# Patient Record
Sex: FEMALE | URBAN - METROPOLITAN AREA
[De-identification: names, ages, dates, MRNs, and addresses within clinical notes are randomized per-mention and may not be internally consistent; named-entity substitution may affect disease eponyms.]

---

## 2018-09-21 ENCOUNTER — TELEPHONE (OUTPATIENT)
Dept: TRANSPLANT | Facility: CLINIC | Age: 58
End: 2018-09-21

## 2018-09-21 NOTE — TELEPHONE ENCOUNTER
----- Message from Medhat Zarate sent at 9/21/2018 12:16 PM CDT -----  Regarding: FW: Outside patient referral  Have medical recorders that where scanned into media from [facility]. Will call referring MD office if we need any additional information on the pt.    By: Medhat Zarate  ----- Message -----  From: Rianna Walsh  Sent: 9/21/2018  12:00 PM  To: [facility] Referral Pool  Subject: Outside patient referral                         Good afternoon,    Patient is being referred to Dr. Patricio for Hep C and abnormal liver enzymes. Referral and records scanned into .     Thanks!  Rianna

## 2018-09-26 ENCOUNTER — TELEPHONE (OUTPATIENT)
Dept: HEPATOLOGY | Facility: CLINIC | Age: 58
End: 2018-09-26

## 2018-09-26 NOTE — TELEPHONE ENCOUNTER
Dr. Hema Chanel ordered that patient be scheduled for hep c consult appt with Dr. Patricio.  I spoke with patient and scheduled her to see provider on 12/20/18; appt notice mailed.  Clinicals scanned into media.

## 2018-10-01 ENCOUNTER — DOCUMENTATION ONLY (OUTPATIENT)
Dept: TRANSPLANT | Facility: CLINIC | Age: 58
End: 2018-10-01

## 2018-10-01 NOTE — LETTER
October 1, 2018    Hema Chanel MD  [address]  [address]  [place] [address]      Dear Dr. Chanel    Patient: Mariann Morel   MR Number: [id number]   YOB: 1960     Thank you for the referral of Mariann Morel to the [facility] program. An initial appointment will be scheduled for your patient with one of our Hepatologists.      Thank you again for your trust in our program.  If there is anything we can do for you or your staff, please feel free to contact us.        Sincerely,        [facility]  [address] [place] [id number]  [phone]

## 2018-10-01 NOTE — LETTER
October 1, 2018    Mariann Morel  [address] [facility] [facility] [address]      Dear Mariann Morel:    Your doctor has referred you to the [facility]. You will be contacted by our office and an initial appointment will then be scheduled for you.    We look forward to seeing you soon. If you have any further questions, please contact us at [phone].       Sincerely,        [facility]   [address] [place] [address]  [phone]

## 2018-10-01 NOTE — NURSING
Pt records reviewed.  Pt will be referred to Hepatitis C Clinic due to HEP C + DNA  Initial referral received from DR. Hema Chanel   Referral letter sent to provider and patient.

## 2019-02-22 ENCOUNTER — TELEPHONE (OUTPATIENT)
Dept: HEPATOLOGY | Facility: CLINIC | Age: 59
End: 2019-02-22

## 2019-02-22 NOTE — TELEPHONE ENCOUNTER
----- Message from Savita Del Real RN sent at 2/21/2019  9:27 AM CST -----  Contact: Patient   I spoke with patient and cancelled appt as requested.  She needs to be added to waitlist for S[facility] in May 2019 at [facility].  I can't find the button to add her.  Can you please add her to Sheng's list.  ----- Message -----  From: Silvia Oconnor  Sent: 2/21/2019   8:33 AM  To: [facility] Hepatitis C Staff    Patient Requesting Appointment.     Reason for appt.:  Cxl Today's 02/21/19 appointment and Rescdule     When is the first available appointment?  n/a    Communication Preference: [phone]      Additional Information: n/a

## 2019-04-17 ENCOUNTER — TELEPHONE (OUTPATIENT)
Dept: HEPATOLOGY | Facility: CLINIC | Age: 59
End: 2019-04-17

## 2019-04-17 NOTE — TELEPHONE ENCOUNTER
----- Message from Savita Fisher sent at 4/17/2019 10:40 AM CDT -----  Contact: PATIENT       ----- Message -----  From: Silvia Oconnor  Sent: 4/17/2019  10:37 AM  To: Hepatology Scheduling    Needs Advice    Reason for call: patient STATED SHE RECEIVED A CALL TO SCHEDULE APPOINTMENT         Communication Preference: [phone]    Additional Information: n/a

## 2019-04-17 NOTE — TELEPHONE ENCOUNTER
Dr. Chanel referred patient for hep c consult appt 9/2018.  Patient on waiting list to see another provider.  I spoke with patient and consult appt with PA Scheuermann scheduled 6/25/19 in [place] since patient did not want to come to the city.  Clinicals in media.

## 2019-04-17 NOTE — TELEPHONE ENCOUNTER
Called patient to offer an appt at our [facility]. Had to leave a message waiting for a call back.

## 2019-06-25 ENCOUNTER — INITIAL CONSULT (OUTPATIENT)
Dept: HEPATOLOGY | Facility: CLINIC | Age: 59
End: 2019-06-25
Payer: MEDICAID

## 2019-06-25 ENCOUNTER — LAB VISIT (OUTPATIENT)
Dept: LAB | Facility: HOSPITAL | Age: 59
End: 2019-06-25
Attending: SPECIALIST
Payer: MEDICAID

## 2019-06-25 VITALS
HEART RATE: 89 BPM | WEIGHT: 256.81 LBS | DIASTOLIC BLOOD PRESSURE: 97 MMHG | SYSTOLIC BLOOD PRESSURE: 145 MMHG | BODY MASS INDEX: 41.27 KG/M2 | HEIGHT: 66 IN

## 2019-06-25 DIAGNOSIS — K74.60 HEPATIC CIRRHOSIS, UNSPECIFIED HEPATIC CIRRHOSIS TYPE, UNSPECIFIED WHETHER ASCITES PRESENT: ICD-10-CM

## 2019-06-25 DIAGNOSIS — B18.2 CHRONIC HEPATITIS C WITHOUT HEPATIC COMA: Primary | ICD-10-CM

## 2019-06-25 DIAGNOSIS — B18.2 CHRONIC HEPATITIS C WITHOUT HEPATIC COMA: ICD-10-CM

## 2019-06-25 LAB
ALBUMIN SERPL BCP-MCNC: 2.9 G/DL (ref 3.5–5.2)
ALP SERPL-CCNC: 120 U/L (ref 55–135)
ALT SERPL W/O P-5'-P-CCNC: 71 U/L (ref 10–44)
ANION GAP SERPL CALC-SCNC: 9 MMOL/L (ref 8–16)
AST SERPL-CCNC: 94 U/L (ref 10–40)
BASOPHILS # BLD AUTO: 0.03 K/UL (ref 0–0.2)
BASOPHILS NFR BLD: 0.7 % (ref 0–1.9)
BILIRUB SERPL-MCNC: 0.6 MG/DL (ref 0.1–1)
BUN SERPL-MCNC: 7 MG/DL (ref 6–20)
CALCIUM SERPL-MCNC: 9.5 MG/DL (ref 8.7–10.5)
CHLORIDE SERPL-SCNC: 104 MMOL/L (ref 95–110)
CO2 SERPL-SCNC: 24 MMOL/L (ref 23–29)
CREAT SERPL-MCNC: 0.7 MG/DL (ref 0.5–1.4)
DIFFERENTIAL METHOD: ABNORMAL
EOSINOPHIL # BLD AUTO: 0.1 K/UL (ref 0–0.5)
EOSINOPHIL NFR BLD: 2.9 % (ref 0–8)
ERYTHROCYTE [DISTWIDTH] IN BLOOD BY AUTOMATED COUNT: 12.4 % (ref 11.5–14.5)
EST. GFR  (AFRICAN AMERICAN): >60 ML/MIN/1.73 M^2
EST. GFR  (NON AFRICAN AMERICAN): >60 ML/MIN/1.73 M^2
GLUCOSE SERPL-MCNC: 96 MG/DL (ref 70–110)
HCT VFR BLD AUTO: 42.2 % (ref 37–48.5)
HGB BLD-MCNC: 13.7 G/DL (ref 12–16)
IMM GRANULOCYTES # BLD AUTO: 0.01 K/UL (ref 0–0.04)
IMM GRANULOCYTES NFR BLD AUTO: 0.2 % (ref 0–0.5)
INR PPP: 1.3 (ref 0.8–1.2)
LYMPHOCYTES # BLD AUTO: 1.4 K/UL (ref 1–4.8)
LYMPHOCYTES NFR BLD: 32.5 % (ref 18–48)
MCH RBC QN AUTO: 29.5 PG (ref 27–31)
MCHC RBC AUTO-ENTMCNC: 32.5 G/DL (ref 32–36)
MCV RBC AUTO: 91 FL (ref 82–98)
MONOCYTES # BLD AUTO: 0.9 K/UL (ref 0.3–1)
MONOCYTES NFR BLD: 21.4 % (ref 4–15)
NEUTROPHILS # BLD AUTO: 1.8 K/UL (ref 1.8–7.7)
NEUTROPHILS NFR BLD: 42.3 % (ref 38–73)
NRBC BLD-RTO: 0 /100 WBC
PLATELET # BLD AUTO: 103 K/UL (ref 150–350)
PMV BLD AUTO: 12.9 FL (ref 9.2–12.9)
POTASSIUM SERPL-SCNC: 4 MMOL/L (ref 3.5–5.1)
PROT SERPL-MCNC: 7.8 G/DL (ref 6–8.4)
PROTHROMBIN TIME: 12.7 SEC (ref 9–12.5)
RBC # BLD AUTO: 4.65 M/UL (ref 4–5.4)
SODIUM SERPL-SCNC: 137 MMOL/L (ref 136–145)
WBC # BLD AUTO: 4.21 K/UL (ref 3.9–12.7)

## 2019-06-25 PROCEDURE — 85610 PROTHROMBIN TIME: CPT | Mod: PO

## 2019-06-25 PROCEDURE — 82105 ALPHA-FETOPROTEIN SERUM: CPT

## 2019-06-25 PROCEDURE — 99204 PR OFFICE/OUTPT VISIT, NEW, LEVL IV, 45-59 MIN: ICD-10-PCS | Mod: S$PBB,,, | Performed by: PHYSICIAN ASSISTANT

## 2019-06-25 PROCEDURE — 87902 NFCT AGT GNTYP ALYS HEP C: CPT

## 2019-06-25 PROCEDURE — 86703 HIV-1/HIV-2 1 RESULT ANTBDY: CPT

## 2019-06-25 PROCEDURE — 86706 HEP B SURFACE ANTIBODY: CPT

## 2019-06-25 PROCEDURE — 80053 COMPREHEN METABOLIC PANEL: CPT

## 2019-06-25 PROCEDURE — 86790 VIRUS ANTIBODY NOS: CPT

## 2019-06-25 PROCEDURE — 99999 PR PBB SHADOW E&M-EST. PATIENT-LVL III: ICD-10-PCS | Mod: PBBFAC,,, | Performed by: PHYSICIAN ASSISTANT

## 2019-06-25 PROCEDURE — 86704 HEP B CORE ANTIBODY TOTAL: CPT

## 2019-06-25 PROCEDURE — 36415 COLL VENOUS BLD VENIPUNCTURE: CPT | Mod: PO

## 2019-06-25 PROCEDURE — 99999 PR PBB SHADOW E&M-EST. PATIENT-LVL III: CPT | Mod: PBBFAC,,, | Performed by: PHYSICIAN ASSISTANT

## 2019-06-25 PROCEDURE — 99213 OFFICE O/P EST LOW 20 MIN: CPT | Mod: PBBFAC,PO | Performed by: PHYSICIAN ASSISTANT

## 2019-06-25 PROCEDURE — 87522 HEPATITIS C REVRS TRNSCRPJ: CPT

## 2019-06-25 PROCEDURE — 99204 OFFICE O/P NEW MOD 45 MIN: CPT | Mod: S$PBB,,, | Performed by: PHYSICIAN ASSISTANT

## 2019-06-25 PROCEDURE — 87340 HEPATITIS B SURFACE AG IA: CPT

## 2019-06-25 PROCEDURE — 85025 COMPLETE CBC W/AUTO DIFF WBC: CPT

## 2019-06-25 RX ORDER — PANTOPRAZOLE SODIUM 40 MG/1
1 TABLET, DELAYED RELEASE ORAL DAILY
Refills: 2 | COMMUNITY
Start: 2019-03-20 | End: 2019-09-10

## 2019-06-25 RX ORDER — MELOXICAM 15 MG/1
TABLET ORAL
Refills: 1 | COMMUNITY
Start: 2019-03-20 | End: 2019-09-10

## 2019-06-25 RX ORDER — IBUPROFEN 200 MG
200 TABLET ORAL EVERY 6 HOURS PRN
COMMUNITY

## 2019-06-25 NOTE — PROGRESS NOTES
HEPATOLOGY CLINIC VISIT NOTE - HCV clinic    OUTSIDE REFERRING PROVIDER: Dr. Hema Chanel     CHIEF COMPLAINT: Hepatitis C     HISTORY: This is a 59 y.o. Black female with chronic hepatitis C, here for further eval / mngmt.   Outside records have been received / reviewed.     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)    No blood transfusions    No tattoos  - Treatment naive  - Genotype unknown  - HCV RNA >1 million IU/mL - 9/12/18    Liver staging:  HCV FibroSURE 9/13/18 - A3, F4    Labs 9/2018:  FIB-4 -- 4.08, (indicates advanced fibrosis more likely)  APRI -- 1.57, (indicates advanced fibrosis more likely)    No liver imaging  Labs reveal well preserved liver function    Liver disease appears well compensated  -- Ascites - none  -- HE - none  -- Jaundice / TBili - none  -- Albumin -none    MELD score 9/2018 - 8    HCC screening - needed      Feels well  Denies jaundice, dark urine, abdominal distention, hematemesis, melena, slowed mentation.   No abnormal skin rashes. No generalized joint pain.                     Past Medical History:   Diagnosis Date    Chronic hepatitis C     GERD (gastroesophageal reflux disease)        Past Surgical History:   Procedure Laterality Date    BILATERAL TUBAL LIGATION         FAMILY HISTORY: Negative for liver disease    SOCIAL HISTORY:   Works at nursing home. . Adult children.  Social History     Tobacco Use   Smoking Status Never Smoker     Alcohol - drinks ~ 6 beers per day x many years  Drugs - denies      ROS:   No fever, chills, weight loss, fatigue  No chest pain, palpitations, dyspnea, cough  No abdominal pain, change in bowel pattern, nausea, vomiting, (+) GERD  No dysuria, hematuria   No skin rashes   No headaches  No lower extremity edema  No depression or  anxiety      PHYSICAL EXAM:  Friendly Unknown female, in no acute distress; alert and oriented to person, place and time  VITALS: reviewed  HEENT: Sclerae anicteric.   NECK: Supple  CVS: Regular rate and rhythm. No murmurs  LUNGS: Normal respiratory effort. Clear bilaterally  ABDOMEN: Obese, soft, nontender. No organomegaly or masses. No ascites or hernias. Good bowel sounds   SKIN: Warm and dry. No jaundice, No obvious rashes.   EXTREMITIES: No lower extremity edema  NEURO/PSYCH: Normal gate. Memory intact. Thought and speech pattern appropriate. Behavior normal. No depression or anxiety noted.    RECENT LABS:  OUTSIDE LABS (in media)  9/12/18  WBC 5.3, HGB 14.1,   , K 3.4, BUN 10, CR 0.74, ALBUMIN 3.6, ALKPHOS 101, TBILI 0.9, AST 80, ALT 80  INR 1.25  HBsAg neg    RECENT IMAGING:  None    ASSESSMENT  59 y.o. Unknown female with:  1. CHRONIC HEPATITIS C, GENOTYPE UNKNOWN - treatment naive  -- Elevated transaminases  -- Unknown Immunity to HAV & HBV    2. CIRRHOSIS  -- HCV FibroSURE 9/2018 - F4  -- MELD score 9/2018 - 8  -- HCC screening - needed      EDUCATION:  HCV  The natural history of Hepatitis C, including potential progression to cirrhosis was reviewed. We discussed the increased progression of liver disease secondary to alcohol use; patient was advised to avoid alcohol completely.     Transmission of Hepatitis C was reviewed, including possible sexual transmission. Sexual contacts should be screened.   Risk of vertical transmission of Hepatitis C from mother to baby was reviewed. Children should be screened.   Patient should avoid sharing personal products such as razors, toothbrushes, etc.     Recommend avoiding raw seafood.  Limit acetaminophen to 2000mg daily.      CIRRHOSIS  Discussed evidence that indicates that pt has cirrhosis.   -- Discussed the basics about liver fibrosis /scarring and how that relates to liver function.   -- Signs and symptoms of hepatic decompensation were reviewed,  including jaundice, ascites, and slowed mentation due to hepatic encephalopathy. The patient should seek medical attention if any of these things occur.   --  We discussed the potential for bleeding from esophageal varices with symptoms of hematemesis and melena. The patient should report to the Emergency Department for these symptoms.   -- We discussed the increased risk of hepatocellular carcinoma due to cirrhosis. Lifelong screening every six months with ultrasound and AFP is recommended.         PLAN:  1. Labs & imaging  Orders Placed This Encounter   Procedures    US Abdomen Complete    Hepatitis C genotype    HIV 1/2 Ag/Ab (4th Gen)    Hepatitis B surface antigen    Hepatitis B surface antibody    Hepatitis B core antibody, total    Hepatitis A antibody, IgG    Comprehensive metabolic panel    CBC auto differential    Protime-INR    AFP tumor marker       2. Follow up visit. Goal of antiviral therapy

## 2019-06-25 NOTE — LETTER
June 25, 2019      Hema Chanel MD  [address]  [address]  [place] [address]           [facility]  [address] [facility] [address] [place] [address]  Phone: [phone]          Patient: Mariann Morel   MR Number: [id number]   YOB: 1960   Date of Visit: 6/25/2019       Dear Dr. Hema Chanel:    Thank you for referring Mariann Morel to me for evaluation. Attached you will find relevant portions of my assessment and plan of care.    If you have questions, please do not hesitate to call me. I look forward to following Mariann Morel along with you.    Sincerely,    Jennifer B. Scheuermann, PA    Enclosure  CC:  No Recipients    If you would like to receive this communication electronically, please contact [email or web address] or [phone] to request more information on [facility] [employer] access.    For providers and/or their staff who would like to refer a patient to [facility], please contact us through our one-stop-shop provider referral line, [facility] [place], at [phone].    If you feel you have received this communication in error or would no longer like to receive these types of communications, please e-mail [email or web address]

## 2019-06-26 ENCOUNTER — TELEPHONE (OUTPATIENT)
Dept: HEPATOLOGY | Facility: CLINIC | Age: 59
End: 2019-06-26

## 2019-06-26 DIAGNOSIS — K74.60 HEPATIC CIRRHOSIS, UNSPECIFIED HEPATIC CIRRHOSIS TYPE, UNSPECIFIED WHETHER ASCITES PRESENT: Primary | ICD-10-CM

## 2019-06-26 DIAGNOSIS — R77.2 ELEVATED AFP: ICD-10-CM

## 2019-06-26 LAB
AFP SERPL-MCNC: 28 NG/ML (ref 0–8.4)
HBV CORE AB SERPL QL IA: NEGATIVE
HBV SURFACE AB SER-ACNC: NEGATIVE M[IU]/ML
HBV SURFACE AG SERPL QL IA: NEGATIVE
HEPATITIS A ANTIBODY, IGG: POSITIVE
HIV 1+2 AB+HIV1 P24 AG SERPL QL IA: NEGATIVE

## 2019-06-26 NOTE — TELEPHONE ENCOUNTER
6/25 labs reviewed  AFP 28 - no prior for comparison    pls call pt  Tell her one of markers for liver is a little higher than normal. This could be from the HCV but I think we need to get a closer picture of her liver (not just an U/S)  · Cancel 9/2019 u/s  · Schedule tpCT (I know she has some transportation difficulties, but if possible I'd like her to do this before September when she comes to see me)  · Keep appt w/ me in 9/2019

## 2019-06-27 NOTE — TELEPHONE ENCOUNTER
Pt u/s canceled as instructed by provider. Pt requested to have Ct of abd completed locally at [facility]. External referral entered for pt. Orders will be faxed to facility after auth. Pt informed of change.

## 2019-06-28 LAB
HCV GENTYP SERPL NAA+PROBE: ABNORMAL
HCV RNA SERPL NAA+PROBE-LOG IU: 6.18 LOG (10) IU/ML
HCV RNA SERPL QL NAA+PROBE: DETECTED
HCV RNA SPEC NAA+PROBE-ACNC: ABNORMAL IU/ML

## 2019-07-01 ENCOUNTER — TELEPHONE (OUTPATIENT)
Dept: HEPATOLOGY | Facility: CLINIC | Age: 59
End: 2019-07-01

## 2019-07-01 NOTE — TELEPHONE ENCOUNTER
----- Message from Balbina Keene sent at 7/1/2019  2:26 PM CDT -----  Contact: Patient   Needs Advice    Reason for call: Would like to know why the US was canceled?        Communication Preference: [phone]    Additional Information: N/A

## 2019-07-01 NOTE — TELEPHONE ENCOUNTER
Attempted to contact pt by phone. No answer. VM left relaying PA Scheuermann msg again on why u/s was canceled. Awaiting call back.

## 2019-07-02 ENCOUNTER — TELEPHONE (OUTPATIENT)
Dept: HEPATOLOGY | Facility: CLINIC | Age: 59
End: 2019-07-02

## 2019-07-02 NOTE — TELEPHONE ENCOUNTER
I spoke with patient and msg from PA Scheuermann relayed.  Order for US faxed to [facility].  Patient states that she will schedule US ASAP.

## 2019-07-02 NOTE — TELEPHONE ENCOUNTER
----- Message from Rain Cutler MA sent at 7/1/2019  4:22 PM CDT -----  Contact: Patient       ----- Message -----  From: Balbina Keene  Sent: 7/1/2019   4:11 PM  To: Scheuermann Jennifer B Staff    Patient Returning Call from [place]    Who Left Message for Patient: Rain    Communication Preference: [phone]     Additional Information: N/A

## 2019-07-09 ENCOUNTER — TELEPHONE (OUTPATIENT)
Dept: HEPATOLOGY | Facility: CLINIC | Age: 59
End: 2019-07-09

## 2019-07-09 NOTE — TELEPHONE ENCOUNTER
I spoke with patient and msg from PA Scheuermann relayed.  Order for CT faxed to [facility]. Patient told to setup test locally.

## 2019-07-09 NOTE — TELEPHONE ENCOUNTER
Outside u/s 7/3/19 reviewed - Fatty liver, no liver masses    Again noted pt has cirrhosis and elevated AFP of 28 from 6/25/19  _______________________________________________________  Please reschedule tpCT  (remind her this is the test I originally wanted her to have but Medicaid would not allow her to do it until she had an u/s first)  My preference would be at [facility] facility but if she can't have that done I understand.

## 2019-07-10 ENCOUNTER — TELEPHONE (OUTPATIENT)
Dept: HEPATOLOGY | Facility: CLINIC | Age: 59
End: 2019-07-10

## 2019-07-10 NOTE — TELEPHONE ENCOUNTER
I spoke with Ty and informed her that CT auth was pending and that we will call once it is in place.

## 2019-07-10 NOTE — TELEPHONE ENCOUNTER
----- Message from Rain Cutler MA sent at 7/9/2019  3:21 PM CDT -----  Contact: Alan w/ [facility]       ----- Message -----  From: Rain Cutler MA  Sent: 7/9/2019   3:20 PM  To: Savita Del Real RN        ----- Message -----  From: Balbina Keene  Sent: 7/9/2019   3:06 PM  To: Scheuermann Jennifer B Staff    Needs Advice    Reason for call: CT orders need to be pre cert by the insurance company        Communication Preference: [phone]    Additional Information: Ty will be in the office until 3:30pm and will return tomorrow at 7am

## 2019-07-15 ENCOUNTER — TELEPHONE (OUTPATIENT)
Dept: HEPATOLOGY | Facility: CLINIC | Age: 59
End: 2019-07-15

## 2019-07-15 DIAGNOSIS — K74.60 HEPATIC CIRRHOSIS, UNSPECIFIED HEPATIC CIRRHOSIS TYPE, UNSPECIFIED WHETHER ASCITES PRESENT: Primary | ICD-10-CM

## 2019-07-15 NOTE — TELEPHONE ENCOUNTER
Physician to physician discussion:   MD at Medicaid unable to approve tpCT since U/S was normal. Recommended I start a formal appeal.    Per Edda at Medicaid appeals office ([phone]):  We must fax to Medicaid ([phone]) a written request for the appeal from the patient. Once this is faxed I can call back to move forward with the appeal    Morena - can we send the following letter to the patient for her to sign and then send back to us? Can you include her name, , Medicaid number on the letter?  I need to know when we receive the letter so we can fax to Medicaid and I can call them for the appeal.     To Whom it May Concern:  I, Mariann Morel, request that Jennifer Scheuermann, PA-C, appeal Medicaid on my behalf for the abdominal CT scan that has been denied.       ADDENDUM:  1.) For now, disregard above message and see below:  Received VM from [place] (phone [phone]) [facility] [employer]  Tracking #[id number]  She reports that a [facility] Appeal Representative Form must be signed by member before we can move forward with the appeal.  This form was reportedly mailed to member and to me on 19. I have not received it. Does the pt have this form? If so, she needs to sign it and send it to us so we can submit it to move forward with the appeal. (If the form can not be located, can you please call [place] and see if we can have a new form sent to us?)      2.) schedule repeat AFP around 19    thanks

## 2019-07-15 NOTE — TELEPHONE ENCOUNTER
----- Message from Savita Del Real RN sent at 7/10/2019  2:20 PM CDT -----  I spoke with Haylie in referral department.  She reports forwarding US report to patient's insurance company but states that they are still denying CT.  Per Haylie you have to engage in a physician to physician discussion to have the decision overturned.  You should call [phone] and follow the prompts to engage in a Physician-to-Physician discussion. The case number is:[id number].

## 2019-07-17 ENCOUNTER — TELEPHONE (OUTPATIENT)
Dept: HEPATOLOGY | Facility: CLINIC | Age: 59
End: 2019-07-17

## 2019-07-17 NOTE — TELEPHONE ENCOUNTER
----- Message from Savita Del Real RN sent at 7/17/2019 12:48 PM CDT -----  I spoke with Naun at [facility] [phone].  He states that appeals form will be faxed to us for completion in 1 - 2 business days.  I spoke with patient.  She is going to provide me with a fax number tomorrow to send form to.  She will complete form allowing you to appeal and fax it back to number provided to her by her insurance company.

## 2019-07-18 NOTE — TELEPHONE ENCOUNTER
I spoke with Naun at [facility].  He states that form will be faxed to our office in 1 -2 business days for patient to complete.  I spoke with patient.  She asked that form be faxed to her at her job ( fax # [phone]) for her completion once received.  Order for AFP to be done locally mailed to patient.

## 2019-07-29 ENCOUNTER — EPISODE CHANGES (OUTPATIENT)
Dept: HEPATOLOGY | Facility: CLINIC | Age: 59
End: 2019-07-29

## 2019-07-29 ENCOUNTER — TELEPHONE (OUTPATIENT)
Dept: HEPATOLOGY | Facility: CLINIC | Age: 59
End: 2019-07-29

## 2019-07-29 LAB — EXT AFP: 33.1 NG/ML

## 2019-07-29 NOTE — TELEPHONE ENCOUNTER
AFP 6/25/19 - 28  ---> AFP 7/25/19 - 33    Outside u/s 7/3/19 reviewed - Fatty liver, no liver masses  Trying to get tpCT abdomen approved  Pt sent in form granting permission for me to appeal on her behalf.    Can you please contact Judy in the Appeals dept [facility]. Her direct line is [phone]. Let her know we have the form and want to move forward w/ the appeal.    Important to let insurance know that she has cirrhosis and AFP is rising so she needs tpCT even though U/S was normal.      pls tell pt we are still working to get the tpCT approved, especially since her tumor marker bumped up a little.    thanks

## 2019-08-01 NOTE — TELEPHONE ENCOUNTER
I spoke with Judy and she confirmed receipt of form/clinicals faxed to her on 7/31/19.  She stated that appeal process would start today.  I spoke with patient and msg from PA Scheuermann relayed and with updated info regarding appeal.

## 2019-08-16 NOTE — TELEPHONE ENCOUNTER
pls call to check on status of auth for CT abdomen.  CT not scheduled anymore so looks like it needs to be r/s once auth is approved

## 2019-08-21 ENCOUNTER — TELEPHONE (OUTPATIENT)
Dept: HEPATOLOGY | Facility: CLINIC | Age: 59
End: 2019-08-21

## 2019-08-21 NOTE — TELEPHONE ENCOUNTER
----- Message from Savita Del Real RN sent at 8/21/2019  1:40 PM CDT -----  Judy left  today stating that CT was approved from 8/13/19 to 9/12/19; approval # [id number].  I spoke with staff at [facility] and patient.  The two will coordinate a time for testing.

## 2019-08-21 NOTE — TELEPHONE ENCOUNTER
----- Message from Joy Phillips RN sent at 8/21/2019  2:21 PM CDT -----  Contact: Vasiliy / [facility] [phone]      ----- Message -----  From: Gisela Gillespie  Sent: 8/21/2019   2:07 PM  To: [facility] Hepat Clinical Staff    Vasiliy is requesting orders for BUN and Creatine. She has to have the values before PT can have contrast for CT tomorrow at 1030 AM.

## 2019-08-26 ENCOUNTER — TELEPHONE (OUTPATIENT)
Dept: HEPATOLOGY | Facility: CLINIC | Age: 59
End: 2019-08-26

## 2019-08-26 LAB
EXT ALBUMIN: 3.5
EXT ALKALINE PHOSPHATASE: 129
EXT ALT: 81
EXT AST: 124
EXT BILIRUBIN TOTAL: 0.9
EXT BUN: 8
EXT CALCIUM: 9.3
EXT CHLORIDE: 107
EXT CO2: 28
EXT CREATININE: 0.56 MG/DL
EXT GFR MDRD AF AMER: >60
EXT GFR MDRD NON AF AMER: >60
EXT GLUCOSE: 110
EXT POTASSIUM: 4.4
EXT PROTEIN TOTAL: 8
EXT SODIUM: 141 MMOL/L

## 2019-08-27 NOTE — TELEPHONE ENCOUNTER
I spoke with patient and msg from PA Scheuermann regarding lab result relayed. CT report received and placed in provider folder for review.

## 2019-08-27 NOTE — TELEPHONE ENCOUNTER
Labs 8/22 stable  I think these were to be done before her CT  Not sure if we need to send them some where or if radiology already saw them and outside CT was completed    She needs to keep appt w/ me in Sept and we need to get outside CT results    thanks

## 2019-09-05 ENCOUNTER — TELEPHONE (OUTPATIENT)
Dept: HEPATOLOGY | Facility: CLINIC | Age: 59
End: 2019-09-05

## 2019-09-05 NOTE — TELEPHONE ENCOUNTER
- Outside CT abdomen 8/22/19 reviewed:  Nodular liver surface  No liver lesions  No ascites  Spleen normal    - Outside labs 8/22/19 reviewed:  , ALT 81, ALK PHOS 129, TBILI 0.9, ALBUMIN 3.5, , K+ 4.4, BUN 8, CR 0.56    Pls tell pt CT looked okay.   Keep f/u visit later this month    thanks

## 2019-09-10 ENCOUNTER — TELEPHONE (OUTPATIENT)
Dept: PHARMACY | Facility: CLINIC | Age: 59
End: 2019-09-10

## 2019-09-10 ENCOUNTER — OFFICE VISIT (OUTPATIENT)
Dept: HEPATOLOGY | Facility: CLINIC | Age: 59
End: 2019-09-10
Payer: MEDICAID

## 2019-09-10 VITALS
BODY MASS INDEX: 40.89 KG/M2 | DIASTOLIC BLOOD PRESSURE: 79 MMHG | WEIGHT: 254.44 LBS | SYSTOLIC BLOOD PRESSURE: 125 MMHG | HEART RATE: 75 BPM | HEIGHT: 66 IN

## 2019-09-10 DIAGNOSIS — D69.6 THROMBOCYTOPENIA: ICD-10-CM

## 2019-09-10 DIAGNOSIS — K74.60 CIRRHOSIS OF LIVER WITHOUT ASCITES, UNSPECIFIED HEPATIC CIRRHOSIS TYPE: Primary | ICD-10-CM

## 2019-09-10 DIAGNOSIS — R74.8 ABNORMAL TRANSAMINASES: ICD-10-CM

## 2019-09-10 DIAGNOSIS — B18.2 CHRONIC HEPATITIS C WITHOUT HEPATIC COMA: ICD-10-CM

## 2019-09-10 PROCEDURE — 99215 PR OFFICE/OUTPT VISIT, EST, LEVL V, 40-54 MIN: ICD-10-PCS | Mod: S$PBB,,, | Performed by: PHYSICIAN ASSISTANT

## 2019-09-10 PROCEDURE — 99999 PR PBB SHADOW E&M-EST. PATIENT-LVL III: CPT | Mod: PBBFAC,,, | Performed by: PHYSICIAN ASSISTANT

## 2019-09-10 PROCEDURE — 99215 OFFICE O/P EST HI 40 MIN: CPT | Mod: S$PBB,,, | Performed by: PHYSICIAN ASSISTANT

## 2019-09-10 PROCEDURE — 99213 OFFICE O/P EST LOW 20 MIN: CPT | Mod: PBBFAC,PO | Performed by: PHYSICIAN ASSISTANT

## 2019-09-10 PROCEDURE — 99999 PR PBB SHADOW E&M-EST. PATIENT-LVL III: ICD-10-PCS | Mod: PBBFAC,,, | Performed by: PHYSICIAN ASSISTANT

## 2019-09-10 RX ORDER — VELPATASVIR AND SOFOSBUVIR 100; 400 MG/1; MG/1
1 TABLET, FILM COATED ORAL DAILY
Qty: 28 TABLET | Refills: 2 | Status: SHIPPED | OUTPATIENT
Start: 2019-09-10 | End: 2020-04-27 | Stop reason: ALTCHOICE

## 2019-09-10 NOTE — PATIENT INSTRUCTIONS
See Primary Care doctor or employer to get Hepatitis B vaccine series (3 shots given over 6 months)    CIRRHOSIS COUNSELING   AVOID ALL alcohol (includes beer, wine and liquor)   AVOID non-steroidal anti-inflammatory drugs (NSAIDs) such as ibuprofen (Motrin, Advil), naproxen (Naprosyn, Aleve)    Tylenol/acetaminophen is OKAY for pain, no more than 2000 mg per day   NO RAW SEAFOOD due to the risk of infection   LOW SODIUM / SALT diet, less than 2000 mg per day (avoid canned foods, avoid adding salt to food, read food labels)   HIGH PROTEIN DIET to prevent muscle mass loss (meat, chicken, fish, eggs, dairy, whey protein powder, protein shakes)    Liver cancer screening (blood tests and ultrasound) is needed every 6 months forever.  This is likely due again in February but I will monitor the blood tumor marker while on HCV treatment    Call Dr Chanel to schedule EGD (upper scope) to check for varices (swollen vessels) [phone]      Call us when you have your HCV medicines so we can schedule labs.

## 2019-09-10 NOTE — PROGRESS NOTES
HEPATOLOGY CLINIC VISIT NOTE - HCV clinic    CHIEF COMPLAINT: Hepatitis C     HISTORY: This is a 59 y.o. Black female with well compensated HCV Cirrhosis.    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)    No blood transfusions    No tattoos  - Treatment naive  - Genotype 1b    Cirrhosis history:  HCV FibroSURE 9/13/18 - A3, F4  Labs 9/2018:   FIB-4 -- 4.08, (indicates advanced fibrosis more likely)   APRI -- 1.57, (indicates advanced fibrosis more likely)    Liver disease appears well compensated  -- Ascites - none  -- HE - none  -- Jaundice / TBili - none  -- Albumin - none  -- Platelets - decreased 103-127    MELD-Na score: 9 at 6/25/2019 12:10 PM  MELD score: 9 at 6/25/2019 12:10 PM  Calculated from:  Serum Creatinine: 0.7 mg/dL (Rounded to 1 mg/dL) at 6/25/2019 12:10 PM  Serum Sodium: 137 mmol/L at 6/25/2019 12:10 PM  Total Bilirubin: 0.6 mg/dL (Rounded to 1 mg/dL) at 6/25/2019 12:10 PM  INR(ratio): 1.3 at 6/25/2019 12:10 PM  Age: 59 years    Cirrhosis maintenance:  HCC screening - mildly elevated AFP likely due to HCV - will monitor. 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  Social History     Tobacco Use   Smoking Status Never Smoker     Alcohol - drinks ~ 6 beers per day x many years  Drugs - denies      ROS:   No fever, chills, weight loss, fatigue  No chest pain, palpitations, dyspnea, cough  No abdominal pain, nausea, vomiting, (+) rare GERD - not taking meds at this time  No skin rashes   No lower extremity edema  No depression or anxiety      PHYSICAL EXAM:  Friendly Unknown female, in no acute distress; alert and oriented to person, place and time  VITALS: reviewed  HEENT: Sclerae anicteric.   NECK: Supple  LUNGS: Normal respiratory effort.   ABDOMEN: Obese, soft, nontender.   SKIN: Warm and dry. No jaundice, No obvious rashes.   EXTREMITIES: No lower extremity edema  NEURO/PSYCH: Normal gate. Memory intact. Thought and speech pattern appropriate. Behavior normal. No depression or anxiety noted.    RECENT LABS:  OUTSIDE LABS (in media)  9/12/18  WBC 5.3, HGB 14.1,   , K 3.4, BUN 10, CR 0.74, ALBUMIN 3.6, ALKPHOS 101, TBILI 0.9, AST 80, ALT 80  INR 1.25  HBsAg neg    Outside labs 8/22/19:  , ALT 81, ALK PHOS 129, TBILI 0.9, ALBUMIN 3.5, , K+ 4.4, BUN 8, CR 0.56    RECENT IMAGING:  Outside U/S 7/3/19: increased echogenicity of liver, normal sized spleen. No mention of ascites    Outside CT abdomen 8/22/19 reviewed:  Nodular liver surface  No liver lesions  No ascites  Spleen normal    ASSESSMENT  59 y.o. black female with:  1. CHRONIC HEPATITIS C, GENOTYPE 1b - treatment naive  -- Elevated transaminases  -- (+) Immunity to HAV   -- Lacking immunity to HBV    2. WELL COMPENSATED CIRRHOSIS  -- HCV FibroSURE 9/2018 - F4  -- MELD score 6/2019 - 9  -- HCC screening - CT 8/2019 w/ no lesions, mildly elevated AFP likely due to HCV, will monitor  -- Varices screening - needed      EDUCATION:  HCV RX  Discussed goal of HCV eradication to prevent progression of liver  disease.  Discussed use of Epclusa daily x 12 weeks w/ potential side effects of fatigue and headache.     Reviewed limitations on acid suppressant medications due to DDI w/ Epclusa:  -- Antacids, H2 Receptor Antagonist, PPI - Pt not taking  Patient instructed to contact me if experiencing acid related symptoms so further recommendations can be made regarding acid suppression therapy.      Herbal / alternative therapies must be discontinued  Discussed importance of medication adherence and risk of treatment failure / viral resistance if not adherent. Pt has verbalized understanding.       CIRRHOSIS  Role of EGD in varices screening reviewed   AVOID ALL alcohol (includes beer, wine and liquor)   AVOID non-steroidal anti-inflammatory drugs (NSAIDs) such as ibuprofen (Motrin, Advil), naproxen (Naprosyn, Aleve)    Tylenol/acetaminophen is OKAY for pain, no more than 2000 mg per day   NO RAW SEAFOOD due to the risk of infection   LOW SODIUM / SALT diet, less than 2000 mg per day (avoid canned foods, avoid adding salt to food, read food labels)   HIGH PROTEIN DIET to prevent muscle mass loss (meat, chicken, fish, eggs, dairy, whey protein powder, protein shakes)      PLAN:  1. Pt to talk to employer or PCP to get HBV vaccine series  2. Pt to call Dr Chanel to schedule EGD for varices screen  3. Obtain authorization to treat HCV with Epclusa x 12 weeks  -- Rx will be routed to [facility]  -- Patient will notify me of exact treatment start date so appropriate lab f/u can be scheduled.  4. Will monitor AFP while on HCV RX. Assuming it trends down, next HCC screening will be due 2/2020 w/ U/S and AFP      Cc: Dr. Hema Chanel

## 2019-09-13 NOTE — TELEPHONE ENCOUNTER
Documentation Only: Prior Authorization for Epclusa IS NOT required Patient co-pay: $0 Patient Assistance IS NOT required. Forwarding to clinical pharmacist for consult and shipment. AKF

## 2019-09-20 ENCOUNTER — TELEPHONE (OUTPATIENT)
Dept: PHARMACY | Facility: CLINIC | Age: 59
End: 2019-09-20

## 2019-09-20 NOTE — TELEPHONE ENCOUNTER
Initial Medication Consultation : AG Epclusa     Initial Epclusa consult completed on . Epclusa will be shipped on  to arrive at patient's home on  via FedEx. $3.00 copay. Patient will start Epclusa on . Address confirmed, CC on file. Confirmed 2 patient identifiers - name and . Therapy Appropriate.     Epclusa 400/100mg- Take one tablet by mouth daily x 12 weeks  Counseling was reviewed:   1. Patient MUST take Epclusa at the SAME time every day.   2. Patient MUST avoid acid reducers without consulting with myself or provider first. Antacids are to be spaced out at least 4 hours apart from Epclusa.    3. Potential Side effects include: headaches and fatigue.   Headache: Patient may treat with OTC remedies. If Tylenol is used, dose should not exceed 2000mg per day.    4. Medication list reviewed. No DDIs or allergies noted. Patient MUST contact myself or provider prior to starting any new OTC, herbal, or prescription drugs to avoid potential DDIs.    DDI: Medication list reviewed and potential DDIs addressed.    Discussed the importance of staying well hydrated while on therapy. Compliance stressed - patient to take missed doses as soon as remembered, but NOT to take 2 doses in one day. Patient will report questions or concerns to myself or practitioner. Patient verbalizes understanding. Patient plans to start Epclusa on .  Consultation included: indication; goals of treatment; administration; storage and handling; side effects; how to handle side effects; the importance of compliance; how to handle missed doses; the importance of laboratory monitoring; the importance of keeping all follow up appointments.  Patient understands to report any medication changes to [facility] and provider. All questions answered and addressed to patients satisfaction. I will f/u with her in 1 week from start, [facility] to contact patient in 3 weeks for refills.     AG Epclusa dispensed due to Medicaid formulary. Any reference  to Epclusa in above note is for AG Denisse Leal, PharmD, [facility]  Clinical Pharmacist   [facility]  Phone: [phone]

## 2019-09-25 ENCOUNTER — EPISODE CHANGES (OUTPATIENT)
Dept: HEPATOLOGY | Facility: CLINIC | Age: 59
End: 2019-09-25

## 2019-09-26 ENCOUNTER — TELEPHONE (OUTPATIENT)
Dept: HEPATOLOGY | Facility: CLINIC | Age: 59
End: 2019-09-26

## 2019-09-27 NOTE — TELEPHONE ENCOUNTER
Pt beginning 12 weeks of Epclusa on 9/26/19  Lilian 1b, tx naive  F4    Pls schedule:  - CMP, HCV RNA, AFP at week 6 - 11/6/19  - CMP, HCV RNA - SVR12 - 3/11/20

## 2019-09-27 NOTE — TELEPHONE ENCOUNTER
----- Message from Savita Del Real RN sent at 9/25/2019 10:46 AM CDT -----  Patient states that she had EGD done 9/24 and will start hep c tx on 9/26.  Patient scheduled to see gastro MD 10/9 and states that she will have MD to fax EGD and path report to our office for review.

## 2019-09-30 ENCOUNTER — EPISODE CHANGES (OUTPATIENT)
Dept: HEPATOLOGY | Facility: CLINIC | Age: 59
End: 2019-09-30

## 2019-09-30 NOTE — TELEPHONE ENCOUNTER
Msg from provider mailed to patient.  Orders mailed to patient and faxed [facility] as ordered.

## 2019-10-16 ENCOUNTER — TELEPHONE (OUTPATIENT)
Dept: PHARMACY | Facility: CLINIC | Age: 59
End: 2019-10-16

## 2019-10-17 NOTE — TELEPHONE ENCOUNTER
(Epclusa refill 2 of 3): The patient contacted [facility] in regards to Epclusa refill. Confirmed she started Epclusa as planned on 9/26. She has 6 doses on hand - refill needed by 10/24. Will ship 10/21 for patient to receive 10/22. $3 copay, patient stated she mailed in $9 to cover copays for full course of tx. Called Jamie in accounting and confirmed this was received - he will update account within the next week. No changes in other medications, allergies, health conditions or missed doses.    Confirmed the patient is taking Epclusa as prescribed: 1 tablet once daily at the same time every day. She has done a great job with adherence - no missed doses. She stores appropriately at room temperature. She denied side effects such as fatigue or headache. She did report some constipation and asked for OTC recommendations. Discussed she may take a stool softener such as docusate. She verbalized understanding. Encouraged her to reach out with any questions/concerns or new medications. [facility] will continue to complete each refill.

## 2019-10-25 ENCOUNTER — TELEPHONE (OUTPATIENT)
Dept: HEPATOLOGY | Facility: CLINIC | Age: 59
End: 2019-10-25

## 2019-10-25 NOTE — TELEPHONE ENCOUNTER
Outside records reviewed  EGD - Dr Chanel 9/24/19  Esophagitis, gastritis. HPylori negative  Hiatal hernia  No varices

## 2019-11-11 ENCOUNTER — TELEPHONE (OUTPATIENT)
Dept: HEPATOLOGY | Facility: CLINIC | Age: 59
End: 2019-11-11

## 2019-11-11 LAB
EXT AFP: 24.7 MG/ML
EXT ALBUMIN: 4.1
EXT ALKALINE PHOSPHATASE: 105
EXT ALT: 47
EXT AST: 41
EXT BILIRUBIN TOTAL: 0.9
EXT BUN: 9
EXT CALCIUM: 9.4
EXT CHLORIDE: 103
EXT CO2: 28
EXT CREATININE: 0.69 MG/DL
EXT GFR MDRD AF AMER: >60
EXT GFR MDRD NON AF AMER: >60
EXT GLUCOSE: 109
EXT HCV RNA QUANT PCR: NORMAL IU/ML
EXT POTASSIUM: 3.9
EXT PROTEIN TOTAL: 8.2
EXT SODIUM: 143 MMOL/L

## 2019-11-13 ENCOUNTER — TELEPHONE (OUTPATIENT)
Dept: PHARMACY | Facility: CLINIC | Age: 59
End: 2019-11-13

## 2019-11-13 NOTE — TELEPHONE ENCOUNTER
AG Epclusa (3 of 3)  refill confirmed. We will ship AG Epclusa refill on  via fedex to arrive on . $3.00 copay- 004. Confirmed 2 patient identifiers - name and .     Patient has 9 doses of AG Epclusa remaining and takes it around 9am daily.  Pt reports they are not having any side effects so far. No missed doses, no new medications, no new allergies or health conditions reported at this time. All questions answered and addressed to patients satisfaction. Pt verbalized understanding.    Per previous comments, patient mailed in $9 cash and has an account balance on file.

## 2019-11-14 ENCOUNTER — TELEPHONE (OUTPATIENT)
Dept: HEPATOLOGY | Facility: CLINIC | Age: 59
End: 2019-11-14

## 2019-11-15 NOTE — TELEPHONE ENCOUNTER
HCV LAB REVIEW  Week 6 of Epclusa, planning on 12 weeks treatment  Lilian 1b, tx naive  F4    Pertinent labs:  11/6/19  HCV neg  CMP stable, liver enzymes improving  AFP 24 (down from 33)    pls call pt:  Labs look good.   Liver enzymes are improving. Hep C virus is now negative.   Tumor marker blood test is still elevated but it is improving.    - Continue Epclusa - 1 pill daily - don't miss any doses.  - Should be half way through treatment.  - There is a small chance the virus can return during the 3 months after treatment ends. We will monitor blood for this.       Next labs to see if Hep C is cured are due:   - CMP, HCV RNA - SVR12 - 3/11/20    Next liver cancer screening due 2/2020:  pls schedule CBC, INR, AFP, U/S  (it's fine if she wants to do this outside of [facility])

## 2019-11-20 NOTE — TELEPHONE ENCOUNTER
Msg from provider mailed to patient.  Orders sent to her to have testing done locally and faxed to outside facility.

## 2020-01-16 ENCOUNTER — EPISODE CHANGES (OUTPATIENT)
Dept: HEPATOLOGY | Facility: CLINIC | Age: 60
End: 2020-01-16

## 2020-01-16 ENCOUNTER — TELEPHONE (OUTPATIENT)
Dept: HEPATOLOGY | Facility: CLINIC | Age: 60
End: 2020-01-16

## 2020-01-16 NOTE — TELEPHONE ENCOUNTER
Patient had an abd US done at [facility] on 1/15/20; report placed in folder for PA Scheuermann's review.

## 2020-02-12 ENCOUNTER — EPISODE CHANGES (OUTPATIENT)
Dept: HEPATOLOGY | Facility: CLINIC | Age: 60
End: 2020-02-12

## 2020-02-12 ENCOUNTER — TELEPHONE (OUTPATIENT)
Dept: HEPATOLOGY | Facility: CLINIC | Age: 60
End: 2020-02-12

## 2020-02-12 LAB
BASOPHILS NFR BLD: 0 % (ref 0–3)
EOSINOPHIL NFR BLD: 3 %
EXT BASO #: 0
EXT BASOPHIL%: 0.5
EXT EOS #: 0.1
EXT EOSINOPHIL%: 1.9
EXT HEMATOCRIT: 39.9
EXT HEMOGLOBIN: 13.6
EXT LYMPH #: 2.3
EXT LYMPH%: 40.2
EXT MCH: 30.3
EXT MCHC: 34.1
EXT MCV: 88.7
EXT MONO #: 1.1
EXT MONOCYTES%: 19
EXT MPV: 9.1
EXT NEUT%: 38.4
EXT NEUTROPHILS (ABSOLUTE): 2.2
EXT PLATELETS: 138
EXT PT: 14
EXT RBC: 4.5
EXT RDW: 13
EXT WBC: 5.8
INR PPP: 1.28 (ref 2–3)
LYMPH %: 46
Lab: 0
MANUAL DIFFERENTIAL: NORMAL
MONO %: 11
SEGS%: 40

## 2020-02-13 ENCOUNTER — EPISODE CHANGES (OUTPATIENT)
Dept: HEPATOLOGY | Facility: CLINIC | Age: 60
End: 2020-02-13

## 2020-02-13 ENCOUNTER — TELEPHONE (OUTPATIENT)
Dept: HEPATOLOGY | Facility: CLINIC | Age: 60
End: 2020-02-13

## 2020-02-13 LAB — EXT AFP: 12.2 NG/ML

## 2020-02-19 ENCOUNTER — EPISODE CHANGES (OUTPATIENT)
Dept: HEPATOLOGY | Facility: CLINIC | Age: 60
End: 2020-02-19

## 2020-02-20 ENCOUNTER — TELEPHONE (OUTPATIENT)
Dept: HEPATOLOGY | Facility: CLINIC | Age: 60
End: 2020-02-20

## 2020-02-20 DIAGNOSIS — K74.60 HEPATIC CIRRHOSIS, UNSPECIFIED HEPATIC CIRRHOSIS TYPE, UNSPECIFIED WHETHER ASCITES PRESENT: Primary | ICD-10-CM

## 2020-02-20 NOTE — TELEPHONE ENCOUNTER
2/12/20 labs and 1/15/20 u/s reviewed - stable  AFP down to 12.2 from 24.7  CBC, INR stable  U/S no liver lesions    Please notify patient:  I have reviewed the recent labs and ultrasound.  Liver cancer screening looked fine. There are no tumors in the liver.  Next liver monitoring is due in 6 months.    Please schedule: CBC, CMP, INR, AFP, U/S, VISIT in 7/2020  Will need f/u visit 7/2020 as well (I think she does alaina)      Thanks

## 2020-02-21 NOTE — TELEPHONE ENCOUNTER
Attempt made to reach patient.  Msg from PA Scheuermann left on her VM and mailed to her.   Patient likes to have testing done locally.  Orders mailed to her to have it done 7/14 and visit with PA Scheuermann scheduled 7/28; appt notice mailed.

## 2020-03-27 ENCOUNTER — EPISODE CHANGES (OUTPATIENT)
Dept: HEPATOLOGY | Facility: CLINIC | Age: 60
End: 2020-03-27

## 2020-04-17 ENCOUNTER — EPISODE CHANGES (OUTPATIENT)
Dept: HEPATOLOGY | Facility: CLINIC | Age: 60
End: 2020-04-17

## 2020-04-20 ENCOUNTER — EPISODE CHANGES (OUTPATIENT)
Dept: HEPATOLOGY | Facility: CLINIC | Age: 60
End: 2020-04-20

## 2020-04-23 ENCOUNTER — TELEPHONE (OUTPATIENT)
Dept: HEPATOLOGY | Facility: CLINIC | Age: 60
End: 2020-04-23

## 2020-04-23 ENCOUNTER — EPISODE CHANGES (OUTPATIENT)
Dept: HEPATOLOGY | Facility: CLINIC | Age: 60
End: 2020-04-23

## 2020-04-23 LAB
EXT ALBUMIN: 4.1
EXT ALKALINE PHOSPHATASE: 116
EXT ALT: 44
EXT AST: 46
EXT BILIRUBIN TOTAL: 0.8
EXT BUN: 12
EXT CALCIUM: 9.4
EXT CHLORIDE: 105
EXT CO2: 27
EXT CREATININE: 0.53 MG/DL
EXT GFR MDRD AF AMER: >60
EXT GFR MDRD NON AF AMER: >60
EXT GLUCOSE: 105
EXT POTASSIUM: 4.3
EXT PROTEIN TOTAL: 8.4
EXT SODIUM: 140 MMOL/L

## 2020-04-27 ENCOUNTER — EPISODE CHANGES (OUTPATIENT)
Dept: HEPATOLOGY | Facility: CLINIC | Age: 60
End: 2020-04-27

## 2020-04-27 ENCOUNTER — TELEPHONE (OUTPATIENT)
Dept: HEPATOLOGY | Facility: CLINIC | Age: 60
End: 2020-04-27

## 2020-04-27 DIAGNOSIS — Z86.19 HISTORY OF HEPATITIS C: ICD-10-CM

## 2020-04-27 LAB — EXT HCV RNA QUANT PCR: NORMAL IU/ML

## 2020-04-27 NOTE — TELEPHONE ENCOUNTER
HCV LAB REVIEW  Completed 12 weeks of Epclusa, 12/2019  Lilian 1b, tx naive  F4    Pertinent labs:  4/23/20  HCV neg  CMP stable, liver enzymes improving, AST 46, ALT 44    These labs document SVR12 following successful HCV treatment with Epclusa      please tell patient:  1.) Lab test shows there is NO Hepatitis C in the blood. This means the Hepatitis C is cured!!  We do not expect the virus to return.  This does not give protection from Hepatitis C and patient could be infected again if ever exposed to the virus again.    2.) Cirrhosis is still present and patient needs monitoring of liver and liver cancer screening every 6 months for the rest of their life. This is due again in 7/2020      Please schedule   CBC, CMP, INR, AFP, U/S, VISIT in 7/2020 -> I think already scheduled. Need to ADD HCV RNA

## 2020-07-10 ENCOUNTER — TELEPHONE (OUTPATIENT)
Dept: HEPATOLOGY | Facility: CLINIC | Age: 60
End: 2020-07-10

## 2020-07-13 ENCOUNTER — TELEPHONE (OUTPATIENT)
Dept: HEPATOLOGY | Facility: CLINIC | Age: 60
End: 2020-07-13

## 2020-07-13 NOTE — TELEPHONE ENCOUNTER
Outside u/s 7/9/20: normal  No liver lesions, spleen normal    Has visit scheduled 7/28/20    1. u/s looked fine  2. See if she had outside labs done - CBC, CMP, INR, AFP. If not already done pls schedule  3. Keep [place] f/u visit w/ me later this month - or can change to video visit (doesn't have to be Tuesday) if she'd rather    ADDENDUM:  Outside labs scanned in media reviewed:  7/9/20 CBC, CMP, INR, AFP stable  AFP 11.5    pls tell pt  1. U/s & labs looked fine. Liver is working well.   2. Keep Ashley f/u visit w/ me later this month - or can change to video visit (doesn't have to be Tuesday) if she'd rather    Thanks

## 2020-07-14 NOTE — TELEPHONE ENCOUNTER
Attempted to contact pt by phone. No answer. VM left. Awaiting call back. Pt labs scanned into media for review for PA Scheuermann.

## 2020-07-16 NOTE — TELEPHONE ENCOUNTER
Spoke with pt PA Scheuermann msg relayed. Pt verbalized understanding and agreement. Pt office visit converted into video visit.

## 2020-07-28 ENCOUNTER — TELEPHONE (OUTPATIENT)
Dept: HEPATOLOGY | Facility: CLINIC | Age: 60
End: 2020-07-28

## 2020-07-28 NOTE — TELEPHONE ENCOUNTER
Pt was scheduled for video visit today but had wifi / internet issues   pls r/s visit  (If she'd like to try video visit again that is fine)

## 2020-08-06 ENCOUNTER — OFFICE VISIT (OUTPATIENT)
Dept: HEPATOLOGY | Facility: CLINIC | Age: 60
End: 2020-08-06
Payer: MEDICAID

## 2020-08-06 ENCOUNTER — TELEPHONE (OUTPATIENT)
Dept: HEPATOLOGY | Facility: CLINIC | Age: 60
End: 2020-08-06

## 2020-08-06 DIAGNOSIS — Z86.19 HISTORY OF HEPATITIS C: ICD-10-CM

## 2020-08-06 DIAGNOSIS — R74.8 ABNORMAL TRANSAMINASES: ICD-10-CM

## 2020-08-06 DIAGNOSIS — K74.60 HEPATIC CIRRHOSIS, UNSPECIFIED HEPATIC CIRRHOSIS TYPE, UNSPECIFIED WHETHER ASCITES PRESENT: Primary | ICD-10-CM

## 2020-08-06 PROCEDURE — 99213 PR OFFICE/OUTPT VISIT, EST, LEVL III, 20-29 MIN: ICD-10-PCS | Mod: 95,,, | Performed by: PHYSICIAN ASSISTANT

## 2020-08-06 PROCEDURE — 99213 OFFICE O/P EST LOW 20 MIN: CPT | Mod: 95,,, | Performed by: PHYSICIAN ASSISTANT

## 2020-08-06 NOTE — TELEPHONE ENCOUNTER
Lab order mailed to patient to have blood work done locally in 6 wks dated 9/17/20.  F/u with testing scheduled 1/26/21; appt notice mailed.

## 2020-08-06 NOTE — PROGRESS NOTES
"HEPATOLOGY VIDEO VISIT NOTE - HCV clinic  The patient location is: her house  Visit type: audiovisual  (Done using [email or web address] video caller due to pt having technical issues w/ Sarah)    Face to Face time with patient: 12 minutes  20 minutes of total time spent on the encounter, which includes face to face time and non-face to face time preparing to see the patient (eg, review of tests), Obtaining and/or reviewing separately obtained history, Documenting clinical information in the electronic or other health record, Independently interpreting results (not separately reported) and communicating results to the patient/family/caregiver, or Care coordination (not separately reported).     Each patient to whom he or she provides medical services by telemedicine is:  (1) informed of the relationship between the physician and patient and the respective role of any other health care provider with respect to management of the patient; and (2) notified that he or she may decline to receive medical services by telemedicine and may withdraw from such care at any time.    CHIEF COMPLAINT: HCV Cirrhosis    HISTORY: This is a 60 y.o. female with well compensated HCV Cirrhosis. Since last visit she successfully completed HCV therapy.  Visit today for routine cirrhosis f/u.    Labs reveal:   1. Mild ALT elevation 41  - overall downward trend / much improvement w/ HCV cure  - did have mention of fatty liver on prior CT  - BMI 41 at last visit (9/2019)  - previously reported 6 beers daily beer daily --> today states still drinking "one beer daily several days per week"    2. Mild AFP elevation of 11  - overall downward trend w/ HCV cure (was in 30s pre HCV rx)  - no lesions on tpCT pre HCV rx  - no lesions on current u/s 7/2020    Reports she's doing well  Denies jaundice, dark urine, hematemesis, melena, slowed mentation, abdominal distention.       Cirrhosis history:  HCV FibroSURE 9/13/18 - A3, F4  Labs 9/2018 (pre HCV rx): FIB-4 " 4.08, APRI 1.57 (indicates advanced fibrosis more likely)  Well compensated; No evidence of portal HTN  · No HE, jaundice, ascites  · Low plt, but upward trend w/ HCV cure    MELD-Na 7/2020: 8    Cirrhosis health maintenance:  - HCC screening - outside U/S 7/9/20 w/ no liver lesions; AFP 7/9/20 11.5 (downward trend)  - Varices screening -  EGD 9/2019 (Dr Chanel) - no varices  - HAV immunity - (+) HAVAB documented 6/25/19  - HBV immunity - lacking immunity 6/2019 -> previously advised to see PCP or employer. States PCP did not have it. Couldn't get it from pharmacy b/c Medicaid wouldn't cover. Again advised to talk to employer since health professional      HCV history:  Completed 12 weeks epclusa w/ SVR12 (cure) - 3/2020  Lilian 1b, treatment naive prior      Past Medical History:   Diagnosis Date    Cirrhosis     GERD (gastroesophageal reflux disease)     History of hepatitis C, s/p successful Rx w/ cure (SVR12 - 3/2020)      Past Surgical History:   Procedure Laterality Date    BILATERAL TUBAL LIGATION      ESOPHAGOGASTRODUODENOSCOPY  09/24/2019    Dr Hema Chanel - no varices. (+) gastritis, esophagitis. 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  Social History     Tobacco Use   Smoking Status Never Smoker     Alcohol - drinks ~ 6 beers per day x many years. See above for current info  Drugs - denies      ROS:   No fever, chills, weight loss, fatigue  No chest pain, palpitations, dyspnea, cough  No abdominal pain, nausea, vomiting  No skin rashes   No headaches  No lower extremity edema  No depression or anxiety    PHYSICAL EXAM:  Friendly female, in no acute distress; alert and oriented to person, place and time  LUNGS: Normal respiratory effort.  NEURO/PSYCH: Memory intact. Thought and speech pattern appropriate. Behavior normal. No depression or anxiety noted.    RECENT LABS:  Outside labs 7/9/20:  WBC 4.7, HGB 13.9, PLT  120  BUN 9, CREATININE 0.56, , GLUCOSE 106, K 4.6, ALBUMIN 3.8, ALKPHOS 115, TOTBILI 0.7, AST 41, ALT 34  INR 1.2  AFP 11.5    RECENT IMAGING:  Outside U/S abdomen 7/9/20 - no liver lesions    ASSESSMENT:  60 y.o. female with:  1. WELL COMPENSATED CIRRHOSIS  -- MELD score 7/2020 - 8  -- HCC screening - up to date 7/2020  -- EGD 7/2019 - no varices (Dr Chanel)    2. HISTORY OF CHRONIC HEPATITIS C, GENOTYPE 1B - treated / cured  -- s/p 12 weeks epclusa w/ SVR 3/2020  -- Elevated transaminases  -- (+) Immunity to HAV   -- lacking immunity to HBV    3. MILD ALT ELEVATION  -- overall much improved w/ HCV rx  -- suspect due to alcohol / fatty liver, don't think serologic eval needed at this point given improvement and level of transaminases. Will consider in future if numbers rise        EDUCATION:  Needs continued liver monitoring and HCC screening every 6 months indefinitely  Recommend complete alcohol abstinence  Will trend LFT        PLAN:  1. See employer to discuss HBV vaccine  2. CMP, Peth, AFP in 6 weeks  3. F/U VISIT W/ CBC, CMP, INR, AFP, U/S - 1/2021  4. D/C all slcohol

## 2020-08-06 NOTE — Clinical Note
-- CMP, Peth, AFP in 6 weeks (she may do labs outside [facility])  -- F/U IN PERSON VISIT with CBC, CMP, INR, AFP, U/S - 1/2021 (Ashley?)

## 2020-08-06 NOTE — TELEPHONE ENCOUNTER
----- Message from Jennifer B. Scheuermann, PA sent at 8/6/2020 12:23 PM CDT -----  -- CMP, Peth, AFP in 6 weeks (she may do labs outside [facility])-- F/U IN PERSON VISIT with CBC, CMP, INR, AFP, U/S - 1/2021 (Ashley?)

## 2020-09-18 LAB
EXT AFP: 11.7 NG/ML
EXT ALBUMIN: 3.8
EXT ALKALINE PHOSPHATASE: 102
EXT ALT: 22
EXT AST: 29
EXT BILIRUBIN TOTAL: 0.8
EXT BUN: 11
EXT CALCIUM: 9.4
EXT CHLORIDE: 106
EXT CO2: 28
EXT CREATININE: 0.59 MG/DL
EXT GFR MDRD AF AMER: >60
EXT GFR MDRD NON AF AMER: >60
EXT GLUCOSE: 102
EXT POTASSIUM: 3.8
EXT PROTEIN TOTAL: 8.1
EXT SODIUM: 141 MMOL/L

## 2020-09-22 ENCOUNTER — TELEPHONE (OUTPATIENT)
Dept: TRANSPLANT | Facility: CLINIC | Age: 60
End: 2020-09-22

## 2020-09-22 LAB — PHOSPHATIDYLETHANOL (PETH): 55 NG/ML

## 2020-09-22 NOTE — TELEPHONE ENCOUNTER
Received call from Amy Coppola [facility], [place]. PETH result of 55. Result given to Dr. Rizo, verbalized acknowledgment, unable to contact LINDA Donaldson.

## 2020-09-24 ENCOUNTER — TELEPHONE (OUTPATIENT)
Dept: HEPATOLOGY | Facility: CLINIC | Age: 60
End: 2020-09-24

## 2020-09-24 NOTE — TELEPHONE ENCOUNTER
9/17/20 labs reviewed:  peth 55  AFP 11.7  CMP stable, transaminases now down to 20s    Reviewed w/ pt

## 2021-01-25 ENCOUNTER — TELEPHONE (OUTPATIENT)
Dept: HEPATOLOGY | Facility: CLINIC | Age: 61
End: 2021-01-25

## 2021-02-23 ENCOUNTER — HOSPITAL ENCOUNTER (OUTPATIENT)
Dept: RADIOLOGY | Facility: HOSPITAL | Age: 61
Discharge: HOME | End: 2021-02-23
Attending: PHYSICIAN ASSISTANT
Payer: MEDICAID

## 2021-02-23 ENCOUNTER — OFFICE VISIT (OUTPATIENT)
Dept: HEPATOLOGY | Facility: CLINIC | Age: 61
End: 2021-02-23
Payer: MEDICAID

## 2021-02-23 VITALS
DIASTOLIC BLOOD PRESSURE: 75 MMHG | SYSTOLIC BLOOD PRESSURE: 141 MMHG | WEIGHT: 248.69 LBS | HEIGHT: 67 IN | BODY MASS INDEX: 39.03 KG/M2 | HEART RATE: 89 BPM

## 2021-02-23 DIAGNOSIS — K74.60 HEPATIC CIRRHOSIS, UNSPECIFIED HEPATIC CIRRHOSIS TYPE, UNSPECIFIED WHETHER ASCITES PRESENT: ICD-10-CM

## 2021-02-23 DIAGNOSIS — R74.8 ABNORMAL TRANSAMINASES: ICD-10-CM

## 2021-02-23 DIAGNOSIS — K74.60 HEPATIC CIRRHOSIS, UNSPECIFIED HEPATIC CIRRHOSIS TYPE, UNSPECIFIED WHETHER ASCITES PRESENT: Primary | ICD-10-CM

## 2021-02-23 PROCEDURE — 76705 ECHO EXAM OF ABDOMEN: CPT | Mod: 26,,, | Performed by: RADIOLOGY

## 2021-02-23 PROCEDURE — 99999 PR PBB SHADOW E&M-EST. PATIENT-LVL III: ICD-10-PCS | Mod: PBBFAC,,, | Performed by: PHYSICIAN ASSISTANT

## 2021-02-23 PROCEDURE — 99999 PR PBB SHADOW E&M-EST. PATIENT-LVL III: CPT | Mod: PBBFAC,,, | Performed by: PHYSICIAN ASSISTANT

## 2021-02-23 PROCEDURE — 76705 US ABDOMEN LIMITED: ICD-10-PCS | Mod: 26,,, | Performed by: RADIOLOGY

## 2021-02-23 PROCEDURE — 99213 OFFICE O/P EST LOW 20 MIN: CPT | Mod: PBBFAC,25,PO | Performed by: PHYSICIAN ASSISTANT

## 2021-02-23 PROCEDURE — 76705 ECHO EXAM OF ABDOMEN: CPT | Mod: TC,PO

## 2021-02-23 PROCEDURE — 99214 PR OFFICE/OUTPT VISIT, EST, LEVL IV, 30-39 MIN: ICD-10-PCS | Mod: S$PBB,,, | Performed by: PHYSICIAN ASSISTANT

## 2021-02-23 PROCEDURE — 99214 OFFICE O/P EST MOD 30 MIN: CPT | Mod: S$PBB,,, | Performed by: PHYSICIAN ASSISTANT

## 2021-02-23 RX ORDER — HEPATITIS B VACCINE (RECOMBINANT) 10 UG/ML
10 INJECTION, SUSPENSION INTRAMUSCULAR; SUBCUTANEOUS ONCE
Qty: 1 ML | Refills: 2 | Status: SHIPPED | OUTPATIENT
Start: 2021-02-23 | End: 2021-02-23

## 2021-02-25 ENCOUNTER — TELEPHONE (OUTPATIENT)
Dept: HEPATOLOGY | Facility: CLINIC | Age: 61
End: 2021-02-25

## 2021-02-25 DIAGNOSIS — K74.60 HEPATIC CIRRHOSIS, UNSPECIFIED HEPATIC CIRRHOSIS TYPE, UNSPECIFIED WHETHER ASCITES PRESENT: Primary | ICD-10-CM

## 2021-05-12 ENCOUNTER — PATIENT MESSAGE (OUTPATIENT)
Dept: RESEARCH | Facility: HOSPITAL | Age: 61
End: 2021-05-12

## 2021-09-22 LAB
EXT ALBUMIN: 4.1
EXT ALKALINE PHOSPHATASE: 116
EXT ALT: 31
EXT AST: 39
EXT BANDS%: 0
EXT BASO #: 0
EXT BASOPHIL%: 0
EXT BASOPHIL%: 0.4
EXT BILIRUBIN TOTAL: 0.7
EXT BUN: 9
EXT CALCIUM: 9.7
EXT CHLORIDE: 105
EXT CO2: 27
EXT CREATININE: 0.63 MG/DL
EXT EOS #: 0.1
EXT EOSINOPHIL%: 2.6
EXT EOSINOPHIL%: 5
EXT GFR MDRD AF AMER: >60
EXT GFR MDRD NON AF AMER: >60
EXT GLUCOSE: 102
EXT HEMATOCRIT: 43.5
EXT HEMOGLOBIN: 14.8
EXT LYMPH #: 1.8
EXT LYMPH%: 32
EXT LYMPH%: 34.2
EXT MCH: 29.8
EXT MCHC: 34.1
EXT MCV: 87.6
EXT MONO #: 0.9
EXT MONOCYTES%: 17
EXT MONOCYTES%: 17.6
EXT MPV: 8.8
EXT NEUT%: 45.2
EXT NEUTROPHILS (ABSOLUTE): 2.4
EXT PLATELETS: 151
EXT POTASSIUM: 4.1
EXT PROTEIN TOTAL: 8.5
EXT PT: 13.4
EXT RBC: 4.97
EXT RDW: 13.1
EXT SEGS%: 46
EXT SODIUM: 140 MMOL/L
EXT WBC: 5.3
INR PPP: 1.07 (ref 2–3)

## 2021-09-27 LAB — EXT AFP: 11.9 NG/ML

## 2021-09-28 ENCOUNTER — TELEPHONE (OUTPATIENT)
Dept: HEPATOLOGY | Facility: CLINIC | Age: 61
End: 2021-09-28

## 2021-09-28 DIAGNOSIS — K74.60 HEPATIC CIRRHOSIS, UNSPECIFIED HEPATIC CIRRHOSIS TYPE, UNSPECIFIED WHETHER ASCITES PRESENT: Primary | ICD-10-CM

## 2021-09-29 ENCOUNTER — TELEPHONE (OUTPATIENT)
Dept: HEPATOLOGY | Facility: CLINIC | Age: 61
End: 2021-09-29

## 2022-03-28 LAB
EXT AFP: 10 NG/ML
EXT ALBUMIN: 4.3
EXT ALKALINE PHOSPHATASE: 105
EXT ALT: 24
EXT AST: 36
EXT BANDS%: 0
EXT BASO #: 0
EXT BASOPHIL%: 0
EXT BASOPHIL%: 0.8
EXT BILIRUBIN TOTAL: 0.9
EXT BUN: 10
EXT CALCIUM: 9.3
EXT CHLORIDE: 106
EXT CO2: 24
EXT CREATININE: 0.65 MG/DL
EXT EOS #: 0.1
EXT EOSINOPHIL%: 2.1
EXT EOSINOPHIL%: 3
EXT GFR MDRD AF AMER: >60
EXT GFR MDRD NON AF AMER: >60
EXT GLUCOSE: 114
EXT HEMATOCRIT: 42.1
EXT HEMOGLOBIN: 14.4
EXT LYMPH #: 1.8
EXT LYMPH%: 33
EXT LYMPH%: 37.3
EXT MCH: 29.1
EXT MCHC: 34.2
EXT MCV: 85.2
EXT MONO #: 0.8
EXT MONOCYTES%: 16.5
EXT MONOCYTES%: 18
EXT MPV: 8.4
EXT NEUT%: 43.3
EXT NEUTROPHILS (ABSOLUTE): 2.1
EXT PLATELETS: 179
EXT POTASSIUM: 4
EXT PROTEIN TOTAL: 8.2
EXT PT: 13.4
EXT RBC: 4.94
EXT RDW: 12.5
EXT SEGS%: 46
EXT SODIUM: 141 MMOL/L
EXT WBC: 4.9
INR PPP: 1.22 (ref 2–3)

## 2022-03-31 ENCOUNTER — OFFICE VISIT (OUTPATIENT)
Dept: HEPATOLOGY | Facility: CLINIC | Age: 62
End: 2022-03-31
Payer: MEDICAID

## 2022-03-31 ENCOUNTER — TELEPHONE (OUTPATIENT)
Dept: HEPATOLOGY | Facility: CLINIC | Age: 62
End: 2022-03-31
Payer: MEDICAID

## 2022-03-31 DIAGNOSIS — K74.60 HEPATIC CIRRHOSIS, UNSPECIFIED HEPATIC CIRRHOSIS TYPE, UNSPECIFIED WHETHER ASCITES PRESENT: Primary | ICD-10-CM

## 2022-03-31 DIAGNOSIS — Z86.19 HEPATITIS C VIRUS INFECTION CURED AFTER ANTIVIRAL DRUG THERAPY: ICD-10-CM

## 2022-03-31 PROCEDURE — 1159F MED LIST DOCD IN RCRD: CPT | Mod: CPTII,95,, | Performed by: PHYSICIAN ASSISTANT

## 2022-03-31 PROCEDURE — 1159F PR MEDICATION LIST DOCUMENTED IN MEDICAL RECORD: ICD-10-PCS | Mod: CPTII,95,, | Performed by: PHYSICIAN ASSISTANT

## 2022-03-31 PROCEDURE — 1160F RVW MEDS BY RX/DR IN RCRD: CPT | Mod: CPTII,95,, | Performed by: PHYSICIAN ASSISTANT

## 2022-03-31 PROCEDURE — 99213 PR OFFICE/OUTPT VISIT, EST, LEVL III, 20-29 MIN: ICD-10-PCS | Mod: 95,,, | Performed by: PHYSICIAN ASSISTANT

## 2022-03-31 PROCEDURE — 99213 OFFICE O/P EST LOW 20 MIN: CPT | Mod: 95,,, | Performed by: PHYSICIAN ASSISTANT

## 2022-03-31 PROCEDURE — 1160F PR REVIEW ALL MEDS BY PRESCRIBER/CLIN PHARMACIST DOCUMENTED: ICD-10-PCS | Mod: CPTII,95,, | Performed by: PHYSICIAN ASSISTANT

## 2022-03-31 NOTE — PROGRESS NOTES
HEPATOLOGY VIDEO VISIT NOTE - HCV clinic  The patient location is: work  Visit type: audiovisual  (Converted to audio during visit due to technical issues)  (Used Doximity due to technical issues w/ MyChart)    Each patient to whom he or she provides medical services by telemedicine is:  (1) informed of the relationship between the physician and patient and the respective role of any other health care provider with respect to management of the patient; and (2) notified that he or she may decline to receive medical services by telemedicine and may withdraw from such care at any time.    CHIEF COMPLAINT: Cirrhosis      HISTORY       This is a 61 y.o.  female with well compensated Cirrhosis due to HCV (now treated, cured), here for routine f/u    Interval history:  Recent routine cirrhosis labs / u/s:  · U/S - no liver lesions or ascites  · Labs - stable CBC,CMP, INR, AFP    Feels well  Had covid recently but was asymptomatic (weekly testing at her work)    Current symptoms of hepatic decompensation:  · Ascites:              none  · TBili elevation:   none  · HE:                    none  · EV bleed:           none      Cirrhosis history:  HCV FibroSURE 9/13/18 - A3, F4 (supported by pre HCV cure APRI / FIB-4)  Well compensated; No evidence of portal HTN  Low plt per HCV cure but normalized w/ cure      MELD 3/2022 - 8     Cirrhosis health maintenance:  - HCC screening - up to date 3/2022  - Varices screening -  EGD 9/2019 (Dr Chanel) - no varices        HCV history:  Completed 12 weeks epclusa w/ SVR12 (cure) - 3/2020  Lilian 1b, treatment naive prior    PMH, PSH, SOCIAL HX, FAMILY HX      Reviewed in Epic  FAMILY HISTORY: Negative for liver disease  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  Alcohol - Hx of daily beer in past.   Drugs - denies      ROS:   Review of Systems   Constitutional: Negative for fever and malaise/fatigue.   Respiratory: Negative for shortness of breath.    Cardiovascular:  Negative for chest pain and leg swelling.   Gastrointestinal: Negative for abdominal pain and melena.       PHYSICAL EXAM:  Friendly  Black or  female, in no acute distress; alert and oriented to person, place and time  LUNGS: Normal respiratory effort.  NEURO/PSYCH: Memory intact. Thought and speech pattern appropriate. Behavior normal. No depression or anxiety noted.      PERTINENT DIAGNOSTIC RESULTS                       ASSESSMENT        61 y.o.    Black or  female with:  1. WELL COMPENSATED CIRRHOSIS  -- MELD score 8  -- HCC screening - up to date 3/2022  -- EGD 7/2019 - no varices (Dr Chanel)     2. HISTORY OF HEPATITIS C, GENOTYPE 1B - treated / cured  -- s/p 12 weeks epclusa w/ SVR 3/2020  -- (+) Immunity to HAV   -- lacking immunity to HBV - previously advised to ask employer for vaccine & given script       PLAN      1. CBC, CMP, INR, AFP, U/S - 9/2022   F/u visit yearly  __________________________________________________________________    Duration of encounter: 27 min  This includes face-to-face time and non face-to-face time preparing to see the patient (eg, review of tests), obtaining and/or reviewing separately obtained history, documenting clinical information in the electronic or other health record, independently interpreting resultsand communicating results to the patient/family/caregiver, or care coordination.

## 2022-03-31 NOTE — TELEPHONE ENCOUNTER
----- Message from Jennifer B. Scheuermann, PA sent at 3/31/2022 11:47 AM CDT -----  Pls schedule cbc, cmp, inr, afp, u/s - 9/2022

## 2022-09-21 LAB
EXT ALBUMIN: 4
EXT ALKALINE PHOSPHATASE: 116
EXT ALT: 38
EXT AST: 38
EXT BANDS%: 0
EXT BASO #: 0
EXT BASOPHIL%: 0
EXT BASOPHIL%: 0.9
EXT BILIRUBIN TOTAL: 0.6
EXT BUN: 9
EXT CALCIUM: 9.5
EXT CHLORIDE: 106
EXT CO2: 24
EXT CREATININE: 0.66 MG/DL
EXT EOS #: 0.1
EXT EOSINOPHIL%: 1
EXT EOSINOPHIL%: 3.2
EXT GFR MDRD AF AMER: >60
EXT GFR MDRD NON AF AMER: >60
EXT GLUCOSE: 112
EXT HEMATOCRIT: 44.6
EXT HEMOGLOBIN: 15.3
EXT LYMPH #: 1.5
EXT LYMPH%: 33.1
EXT LYMPH%: 36
EXT MCH: 29.4
EXT MCHC: 34.4
EXT MCV: 85.6
EXT MONO #: 0.8
EXT MONOCYTES%: 16.3
EXT MONOCYTES%: 17
EXT MPV: 8.2
EXT NEUT%: 46.5
EXT NEUTROPHILS (ABSOLUTE): 2.2
EXT PLATELETS: 187
EXT POTASSIUM: 4.1
EXT PROTEIN TOTAL: 8.6
EXT PT: 12.8
EXT RBC: 5.21
EXT RDW: 12.9
EXT SEGS%: 46
EXT SODIUM: 138 MMOL/L
EXT WBC: 4.7
INR PPP: 1.16 (ref 2–3)

## 2022-09-22 ENCOUNTER — TELEPHONE (OUTPATIENT)
Dept: HEPATOLOGY | Facility: CLINIC | Age: 62
End: 2022-09-22
Payer: MEDICAID

## 2022-09-22 DIAGNOSIS — K74.60 HEPATIC CIRRHOSIS, UNSPECIFIED HEPATIC CIRRHOSIS TYPE, UNSPECIFIED WHETHER ASCITES PRESENT: Primary | ICD-10-CM

## 2022-09-22 LAB — EXT AFP: 12 NG/ML

## 2022-09-22 NOTE — TELEPHONE ENCOUNTER
9/21/22 labs and ultrasound (Scanned in media) reviewed - stable    Please notify patient:  I have reviewed the recent labs and ultrasound.  Liver is working well.  Liver cancer screening looked fine. There are no tumors in the liver.   Next liver monitoring is due in 6 months.    Please schedule: CBC, CMP, INR, AFP, U/S, VISIT in 3/2023    Thanks

## 2022-09-23 NOTE — TELEPHONE ENCOUNTER
I spoke with patient and msg from PA Scheuermann relayed.  Orders mailed to patient and faxed to [facility] dated 3/22/23 for patient to have testing done locally.  Virtual appt with PA Scheuermann scheduled 3/28/23; appt reminder notice mailed.

## 2023-01-05 ENCOUNTER — PATIENT MESSAGE (OUTPATIENT)
Dept: RESEARCH | Facility: HOSPITAL | Age: 63
End: 2023-01-05
Payer: MEDICAID

## 2023-03-20 LAB
AGE: 62
EXT AFP: 11.7 NG/ML
EXT ALBUMIN: 4.3
EXT ALKALINE PHOSPHATASE: 108
EXT ALT: 29
EXT AST: 40
EXT BASO #: 0
EXT BASOPHIL%: 0.2
EXT BILIRUBIN TOTAL: 1.1
EXT BUN: 14
EXT CALCIUM: 9.5
EXT CHLORIDE: 103
EXT CO2: 24
EXT CREATININE: 0.73 MG/DL
EXT EOS #: 0.2
EXT EOSINOPHIL%: 5
EXT GFR MDRD AF AMER: >60
EXT GFR MDRD NON AF AMER: >60
EXT GLUCOSE: 108
EXT HEMATOCRIT: 43.8
EXT HEMOGLOBIN: 15
EXT LYMPH #: 1
EXT LYMPH%: 27.8
EXT MCH: 29.4
EXT MCHC: 34.2
EXT MCV: 86.3
EXT MONO #: 0.6
EXT MONOCYTES%: 15.9
EXT MPV: 8.1
EXT NEUT%: 51.1
EXT NEUTROPHILS (ABSOLUTE): 1.9
EXT PLATELETS: 212
EXT POTASSIUM: 4
EXT PROTEIN TOTAL: 8.3
EXT PT: 13.1
EXT RBC: 5.08
EXT RDW: 13.3
EXT SODIUM: 138 MMOL/L
EXT WBC: 3.7
INR PPP: 1.19 (ref 2–3)

## 2023-03-21 ENCOUNTER — TELEPHONE (OUTPATIENT)
Dept: HEPATOLOGY | Facility: CLINIC | Age: 63
End: 2023-03-21
Payer: MEDICAID

## 2023-03-28 ENCOUNTER — OFFICE VISIT (OUTPATIENT)
Dept: HEPATOLOGY | Facility: CLINIC | Age: 63
End: 2023-03-28
Payer: MEDICAID

## 2023-03-28 ENCOUNTER — TELEPHONE (OUTPATIENT)
Dept: HEPATOLOGY | Facility: CLINIC | Age: 63
End: 2023-03-28
Payer: MEDICAID

## 2023-03-28 DIAGNOSIS — K74.60 HEPATIC CIRRHOSIS, UNSPECIFIED HEPATIC CIRRHOSIS TYPE, UNSPECIFIED WHETHER ASCITES PRESENT: Primary | ICD-10-CM

## 2023-03-28 PROCEDURE — 1160F PR REVIEW ALL MEDS BY PRESCRIBER/CLIN PHARMACIST DOCUMENTED: ICD-10-PCS | Mod: CPTII,95,, | Performed by: PHYSICIAN ASSISTANT

## 2023-03-28 PROCEDURE — 99214 PR OFFICE/OUTPT VISIT, EST, LEVL IV, 30-39 MIN: ICD-10-PCS | Mod: 95,,, | Performed by: PHYSICIAN ASSISTANT

## 2023-03-28 PROCEDURE — 1159F MED LIST DOCD IN RCRD: CPT | Mod: CPTII,95,, | Performed by: PHYSICIAN ASSISTANT

## 2023-03-28 PROCEDURE — 1159F PR MEDICATION LIST DOCUMENTED IN MEDICAL RECORD: ICD-10-PCS | Mod: CPTII,95,, | Performed by: PHYSICIAN ASSISTANT

## 2023-03-28 PROCEDURE — 1160F RVW MEDS BY RX/DR IN RCRD: CPT | Mod: CPTII,95,, | Performed by: PHYSICIAN ASSISTANT

## 2023-03-28 PROCEDURE — 99214 OFFICE O/P EST MOD 30 MIN: CPT | Mod: 95,,, | Performed by: PHYSICIAN ASSISTANT

## 2023-03-28 NOTE — PROGRESS NOTES
HEPATOLOGY VIDEO VISIT NOTE - HCV clinic  The patient location is: work  Visit type: audiovisual  (Used Doximity due to technical issues w/ MyChart)    Each patient to whom he or she provides medical services by telemedicine is:  (1) informed of the relationship between the physician and patient and the respective role of any other health care provider with respect to management of the patient; and (2) notified that he or she may decline to receive medical services by telemedicine and may withdraw from such care at any time.    CHIEF COMPLAINT: Cirrhosis      HISTORY       This is a 62 y.o.  female with well compensated Cirrhosis due to HCV (now treated, cured), being seen for routine f/u    Interval history:  Recent routine cirrhosis labs / u/s:  U/S 3/17/23 - no liver lesions or ascites (in media)  Labs 3/17/23 - stable CBC,CMP, INR, AFP    Current symptoms of hepatic decompensation:  Ascites / CATRACHO - no  Diuretic use - no  TBili elevation - no  HE / confusion / memory problems - no  EV bleed / hematemesis melena - no    Feels well  Husb w/ health issues. 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 Having to do more housework. Due to this and watching diet, now down 20 lbs.    C/o rash - little red bumps on feet.  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 . Adult children.  Alcohol - Hx of daily beer in past.   Drugs  - denies      ROS: as per HPI      PHYSICAL EXAM:  Friendly Black or  female, in no acute distress; alert and oriented to person, place and time  LUNGS: Normal respiratory effort.  NEURO/PSYCH: Memory intact. Thought and speech pattern appropriate. Behavior normal. No depression or anxiety noted.      PERTINENT DIAGNOSTIC RESULTS              03/17/23 00:00   INR 1.19 ! (E)   EXT AFP 11.7 (E)   EXT Albumin 4.3 (E)   EXT Alkaline Phosphatase 108 (E)   EXT ALT 29 (E)   EXT AST 40 (E)   EXT BilirubiN Total 1.1 (E)   EXT BUN 14 (E)   EXT Calcium 9.5 (E)   EXT Chloride 103 (E)   EXT CO2 24 (E)   EXT Creatinine 0.73 (E)   EXT GFR MDRD AF AMER >60 (E)   EXT GFR MDRD NON AF AMER >60 (E)   EXT Glucose 108 (E)   EXT Hematocrit 43.8 (E)   EXT Hemoglobin 15.0 (E)   EXT Platelets 212 (E)   EXT Potassium 4.0 (E)   EXT Protein total 8.3 (E)   EXT PT 13.1 (E)   EXT Sodium 138 (E)   EXT WBC 3.7 (E)         ASSESSMENT        62 y.o.    Black or  female with:  1. WELL COMPENSATED CIRRHOSIS  -- MELD score 9  -- HCC screening - up to date 3/2023  -- EGD 7/2019 - no varices (Dr Chanel)     2. HISTORY OF HEPATITIS C - treated / cured  -- s/p 12 weeks epclusa w/ SVR 3/2020  -- (+) Immunity to HAV   -- lacking immunity to HBV - previously advised to ask employer for vaccine & given script       PLAN      1. CBC, CMP, INR, AFP, U/S - 9/2023  2. EGD - see Dr Chanel (Varices screening)   F/u visit yearly, earlier PRN  __________________________________________________________________    Duration of encounter: 31 min  This includes face-to-face time and non face-to-face time preparing to see the patient (eg, review of tests), obtaining and/or reviewing separately obtained history, documenting clinical information in the electronic or other health record, independently interpreting resultsand communicating results to the patient/family/caregiver, or care coordination.

## 2023-03-28 NOTE — TELEPHONE ENCOUNTER
Orders mailed to patient and will be faxed to local hospital for patient to have testing done locally.

## 2023-03-28 NOTE — TELEPHONE ENCOUNTER
----- Message from Jennifer B. Scheuermann, PA sent at 3/28/2023 12:48 PM CDT -----  Pls schedule  CBC, CMP, INR, AFP, U/S - 9/2023

## 2023-09-12 ENCOUNTER — TELEPHONE (OUTPATIENT)
Dept: HEPATOLOGY | Facility: CLINIC | Age: 63
End: 2023-09-12
Payer: MEDICAID

## 2023-09-12 NOTE — TELEPHONE ENCOUNTER
Attempt made to reach patient unsuccessful. LVM informing patient that ultrasound and lab order refaxed to [facility].  She just needs to call registration for scheduling.

## 2023-09-12 NOTE — TELEPHONE ENCOUNTER
"----- Message from Bryn Dsouza sent at 9/12/2023  2:55 PM CDT -----  Consult/Advisory:          Name Of Caller: Self      Contact Preference?: [phone]       Provider Name: Scheuermann      What is the nature of the call?: Calling to speak w/ office about US orders          Additional Notes:  "Thank you for all that you do for our patients"      "

## 2023-09-18 ENCOUNTER — TELEPHONE (OUTPATIENT)
Dept: HEPATOLOGY | Facility: CLINIC | Age: 63
End: 2023-09-18
Payer: MEDICAID

## 2023-09-18 DIAGNOSIS — K74.60 HEPATIC CIRRHOSIS, UNSPECIFIED HEPATIC CIRRHOSIS TYPE, UNSPECIFIED WHETHER ASCITES PRESENT: Primary | ICD-10-CM

## 2023-09-18 LAB
EXT AFP: 12.6 NG/ML
EXT ALBUMIN: 4.1
EXT ALKALINE PHOSPHATASE: 97
EXT ALT: 34
EXT AST: 47
EXT BANDS%: 1
EXT BASO #: 0
EXT BASOPHIL%: 0
EXT BASOPHIL%: 0.3
EXT BILIRUBIN TOTAL: 0.9
EXT BUN: 7
EXT CALCIUM: 9.2
EXT CHLORIDE: 104
EXT CO2: 27
EXT CREATININE: 0.62 MG/DL
EXT EOS #: 0.2
EXT EOSINOPHIL%: 4.7
EXT EOSINOPHIL%: 7
EXT GFR MDRD AF AMER: >60
EXT GFR MDRD NON AF AMER: >60
EXT GLUCOSE: 109
EXT HEMATOCRIT: 43.7
EXT HEMOGLOBIN: 15.1
EXT LYMPH #: 1.3
EXT LYMPH%: 35.8
EXT LYMPH%: 38
EXT MCH: 31.5
EXT MCHC: 34.4
EXT MCV: 91.4
EXT MONO #: 0.8
EXT MONOCYTES%: 21.7
EXT MONOCYTES%: 7
EXT MPV: 7.8
EXT NEUT%: 37.6
EXT NEUTROPHILS (ABSOLUTE): 1.3
EXT PLATELETS: 158
EXT POTASSIUM: 4.3
EXT PROTEIN TOTAL: 8
EXT PT: 12.4
EXT RBC: 4.78
EXT RDW: 14.4
EXT SEGS%: 41
EXT SODIUM: 138 MMOL/L
EXT WBC: 3.6
INR PPP: 1.06 (ref 2–3)
RBC MORPH BLD: NORMAL

## 2023-09-18 NOTE — TELEPHONE ENCOUNTER
9/15/23 outside labs and ultrasound reviewed - stable    Please notify patient:  I have reviewed the recent labs and ultrasound.  Liver is working well.  Liver cancer screening looked fine. There are no tumors in the liver. Liver cancer blood test was normal.  Next liver monitoring is due in 6 months.    Please schedule: CBC, CMP, INR, AFP, U/S, VISIT in 3/2024      Thanks

## 2023-09-19 NOTE — TELEPHONE ENCOUNTER
I spoke with patient and msg from PA Scheuermann relayed.  Orders mailed to patient and faxed to [facility] dated 3/15/24.  F/u visit scheduled virtually on 3/22/24; appt reminder notice mailed.

## 2024-02-22 ENCOUNTER — TELEPHONE (OUTPATIENT)
Dept: FAMILY MEDICINE | Facility: CLINIC | Age: 64
End: 2024-02-22

## 2024-02-22 NOTE — TELEPHONE ENCOUNTER
----- Message from Emir Almodovar sent at 2/22/2024  2:57 PM CST -----  Type: Patient Call Back         Who called: Brittani ([facility])         What is the request in detail Order for Cpap Supply          Can the clinic reply by [facility]? No         Would the patient rather a call back or a response via [facility]? Call back         Best call back number: [phone] ref #[id number] fax [phone]               Additional Information:         Thank you.

## 2024-03-15 ENCOUNTER — TELEPHONE (OUTPATIENT)
Dept: HEPATOLOGY | Facility: CLINIC | Age: 64
End: 2024-03-15

## 2024-03-22 ENCOUNTER — TELEPHONE (OUTPATIENT)
Dept: HEPATOLOGY | Facility: CLINIC | Age: 64
End: 2024-03-22

## 2024-03-22 NOTE — TELEPHONE ENCOUNTER
----- Message from Jennifer B. Scheuermann, PA sent at 3/22/2024  3:41 PM CDT -----  Pls cancel today's appt b/c of trouble logging on.  R/s for in person  thx

## 2024-03-22 NOTE — TELEPHONE ENCOUNTER
Patient unable to connect and complete virtual visit with PA Scheuermann on 3/22/24.  Appt with provider scheduled in person again on 5/14/24; appt reminder notice mailed.

## 2024-05-14 ENCOUNTER — TELEPHONE (OUTPATIENT)
Dept: HEPATOLOGY | Facility: CLINIC | Age: 64
End: 2024-05-14

## 2024-05-14 NOTE — TELEPHONE ENCOUNTER
----- Message from Noé Javier MA sent at 5/14/2024  8:10 AM CDT -----  Regarding: FW: Reschedule Existing Appointment  Contact: Mariann Morel    ----- Message -----  From: Stephani Ramos  Sent: 5/14/2024   8:09 AM CDT  To: [facility] Hepatology Scheduling  Subject: Reschedule Existing Appointment                  Reschedule Existing Appointment     Current appt date:05/14     Type of appt :ep     Physician:Scheuermann     Reason for rescheduling:Patient is calling to reschedule to [facility] transportation,Requesting a call back     Caller:Mariann Morel      Contact Preference:[phone] (home)

## 2024-05-14 NOTE — TELEPHONE ENCOUNTER
I spoke with patient.  Appt with PA Scheuermann scheduled again on 5/28/24; reminder notice mailed.

## 2024-05-22 ENCOUNTER — TELEPHONE (OUTPATIENT)
Dept: HEPATOLOGY | Facility: CLINIC | Age: 64
End: 2024-05-22

## 2024-05-22 NOTE — TELEPHONE ENCOUNTER
I spoke with patient.  Appt changed to virtual on 5/28/24 as requested and patient provided with instructions to complete visit; appt reminder notice mailed.

## 2024-05-22 NOTE — TELEPHONE ENCOUNTER
----- Message from David Velásquez sent at 5/22/2024 10:06 AM CDT -----  Regarding: Consult/Advisory  Contact: Mariann Morel  Consult/Advisory    Name Of Caller: Marianncarmen Morel       Contact Preference: [phone] (home)      Nature of call: Patient calling to notify that she did get the [facility] shilpa up to start doing virtual visits and would like to change her appt on 5/28/2024 to a virtual appt. Patient states she can do the same date that's scheduled or change to a different date. Requesting a call back.

## 2024-05-28 ENCOUNTER — OFFICE VISIT (OUTPATIENT)
Dept: HEPATOLOGY | Facility: CLINIC | Age: 64
End: 2024-05-28

## 2024-05-28 ENCOUNTER — TELEPHONE (OUTPATIENT)
Dept: HEPATOLOGY | Facility: CLINIC | Age: 64
End: 2024-05-28

## 2024-05-28 DIAGNOSIS — Z86.19 HEPATITIS C VIRUS INFECTION CURED AFTER ANTIVIRAL DRUG THERAPY: ICD-10-CM

## 2024-05-28 DIAGNOSIS — K74.60 HEPATIC CIRRHOSIS, UNSPECIFIED HEPATIC CIRRHOSIS TYPE, UNSPECIFIED WHETHER ASCITES PRESENT: Primary | ICD-10-CM

## 2024-05-28 PROCEDURE — 99213 OFFICE O/P EST LOW 20 MIN: CPT | Mod: 95,,, | Performed by: PHYSICIAN ASSISTANT

## 2024-05-28 NOTE — TELEPHONE ENCOUNTER
Test orders faxed to [facility] and mailed to patient.  I stressed that testing be done on 9/18/24.

## 2024-05-28 NOTE — PROGRESS NOTES
"HEPATOLOGY VIDEO VISIT NOTE - HCV clinic  Visit type: audiovisual    Each patient to whom he or she provides medical services by telemedicine is:  (1) informed of the relationship between the physician and patient and the respective role of any other health care provider with respect to management of the patient; and (2) notified that he or she may decline to receive medical services by telemedicine and may withdraw from such care at any time.    CHIEF COMPLAINT: Cirrhosis      HISTORY       This is a 63 y.o.  female with well compensated Cirrhosis due to HCV (now treated, cured), being seen for routine f/u    Cirrhosis history:  HCV FibroSURE 9/13/18 - A3, F4 (supported by pre HCV cure APRI / FIB-4)  Well compensated  No evidence of portal HTN (prior low plt -> normalized w/ HCV cure)      Cirrhosis health maintenance:  - Varices screening -  EGD 9/2019 (Dr Chanel) - no varices  - HAV status - (+) immunity documented 2019  - HBV status - previously recommended, advised to ask employer and Rx sent to pharm        HCV history:  Completed 12 weeks epclusa w/ SVR12 (cure) - 3/2020  Lilian 1b, treatment naive prior    Interval history (3/28/23):  3/2023 labs / u/s: stable. MELD 9, AFP stable 12, AST 47. No liver lesions or ascites   No s/s liver decompensation  Husb w/ health issues. Now working part time to care for him.  Down 20 lbs (not unexpected: watching diet, doing more housework)  Advised to see Dr Chanel for EGD (EV screen)    Interval history (5/28/24):  Recent routine cirrhosis labs / u/s:  U/S 3/2024 - no liver lesions or ascites   Labs 3/2024 - stable. MELD 9. 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  Alcohol - Hx of daily beer in past.   Drugs - denies    ROS: as per HPI    PHYSICAL EXAM:  Friendly Black or  female, in no acute distress; alert and oriented to person, place and time  LUNGS: Normal respiratory effort.  NEURO/PSYCH: Memory intact. Thought and speech pattern appropriate. Behavior normal. No depression or anxiety noted.      PERTINENT DIAGNOSTIC RESULTS      Labs 3/15/24:  WBC 3.5, HGB 14.1,   , K 4.0, BUN 8, CR 0.63, ALB 3.8, ALKPHOS 80, TBILI 0.6, AST 42, ALT 32  INR 1.08  AFP 11.1      U/S abdomen 3/15/24        ASSESSMENT        63 y.o.  Black or  female with:  1. Well compensated cirrhosis  -- MELD score 9  -- HCC screening - up to date 3/2024  -- EGD 7/2019 - no varices (Dr Chanel)     2. History of HCV, treated / cured  -- s/p 12 weeks epclusa w/ SVR 3/2020      PLAN      1. CBC, CMP, INR, AFP, U/S every 6 months: due 9/2024  2. EGD - see Dr Chanel (Varices screening)   3. Yearly visit: due 5/2024, earlier PRN  4. D/C alcohol.     __________________________________________________________________    Duration of encounter: 26 min  This includes face-to-face time and non face-to-face time preparing to see the patient (eg, review of tests), obtaining and/or reviewing separately obtained history, documenting clinical information in the electronic or other health record, independently interpreting resultsand communicating results to the patient/family/caregiver, or care coordination.        "

## 2024-05-28 NOTE — TELEPHONE ENCOUNTER
----- Message from Jennifer B. Scheuermann, PA sent at 5/28/2024 10:44 AM CDT -----  Pls schedule Cbc, cmp, inr, afp, u/s - 9/2024

## 2024-09-23 ENCOUNTER — TELEPHONE (OUTPATIENT)
Dept: HEPATOLOGY | Facility: CLINIC | Age: 64
End: 2024-09-23

## 2024-09-23 DIAGNOSIS — K74.60 HEPATIC CIRRHOSIS, UNSPECIFIED HEPATIC CIRRHOSIS TYPE, UNSPECIFIED WHETHER ASCITES PRESENT: Primary | ICD-10-CM

## 2024-09-23 NOTE — TELEPHONE ENCOUNTER
Recent labs:            ______________________________________________________  Pls tell pt labs / u/s 9/18/24 looked ok  Liver working well  No liver cancers  Next screening due 3/2025    Pls schedule: CBC,CMP, INR, AFP, U/S, VISIT - 3/2025

## 2024-09-24 NOTE — TELEPHONE ENCOUNTER
Attempt made to reach patient.  Msg from PA Scheuermann left on her VM.  Testing orders mailed to patient and faxed to [facility] dated 3/19/25.  Patient scheduled for a virtual visit with PA Scheuermann on 3/27/25; appt reminder notice mailed.

## 2025-03-28 ENCOUNTER — TELEPHONE (OUTPATIENT)
Dept: HEPATOLOGY | Facility: CLINIC | Age: 65
End: 2025-03-28

## 2025-03-28 NOTE — TELEPHONE ENCOUNTER
Patient cancelled scheduled appt with PA Scheuermann on 3/27.  Attempt made to reach her for rescheduling.  I left a VM and sent a letter asking that she call back.